# Patient Record
Sex: FEMALE | Race: AMERICAN INDIAN OR ALASKA NATIVE | NOT HISPANIC OR LATINO | Employment: UNEMPLOYED
[De-identification: names, ages, dates, MRNs, and addresses within clinical notes are randomized per-mention and may not be internally consistent; named-entity substitution may affect disease eponyms.]

---

## 2017-07-08 ENCOUNTER — HEALTH MAINTENANCE LETTER (OUTPATIENT)
Age: 22
End: 2017-07-08

## 2020-10-17 ENCOUNTER — APPOINTMENT (OUTPATIENT)
Dept: GENERAL RADIOLOGY | Facility: CLINIC | Age: 25
End: 2020-10-17
Attending: EMERGENCY MEDICINE

## 2020-10-17 ENCOUNTER — HOSPITAL ENCOUNTER (EMERGENCY)
Facility: CLINIC | Age: 25
Discharge: HOME | End: 2020-10-17
Attending: EMERGENCY MEDICINE | Admitting: EMERGENCY MEDICINE

## 2020-10-17 VITALS — TEMPERATURE: 96.3 F | OXYGEN SATURATION: 98 % | HEART RATE: 120 BPM

## 2020-10-17 DIAGNOSIS — L20.9 ATOPIC DERMATITIS, UNSPECIFIED TYPE: ICD-10-CM

## 2020-10-17 DIAGNOSIS — M25.511 RIGHT SHOULDER PAIN, UNSPECIFIED CHRONICITY: ICD-10-CM

## 2020-10-17 PROCEDURE — 73030 X-RAY EXAM OF SHOULDER: CPT | Mod: RT

## 2020-10-17 PROCEDURE — 99283 EMERGENCY DEPT VISIT LOW MDM: CPT | Performed by: EMERGENCY MEDICINE

## 2020-10-17 PROCEDURE — 99284 EMERGENCY DEPT VISIT MOD MDM: CPT | Performed by: EMERGENCY MEDICINE

## 2020-10-17 RX ORDER — PERMETHRIN 50 MG/G
CREAM TOPICAL
Qty: 60 G | Refills: 0 | Status: ON HOLD | OUTPATIENT
Start: 2020-10-17 | End: 2023-12-27

## 2020-10-17 RX ORDER — HYDROXYZINE HYDROCHLORIDE 25 MG/1
25-50 TABLET, FILM COATED ORAL 3 TIMES DAILY PRN
Qty: 30 TABLET | Refills: 0 | Status: SHIPPED | OUTPATIENT
Start: 2020-10-17 | End: 2024-04-18
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
## 2020-10-17 NOTE — ED AVS SNAPSHOT
[facility] Emergency Department  [address]  [place] [address]  Phone: [phone]  Fax: [phone]                                    Elizabeth Henson   MRN: [id number]    Department: [facility] Emergency Department   Date of Visit: 10/17/2020           After Visit Summary Signature Page    I have received my discharge instructions, and my questions have been answered. I have discussed any challenges I see with this plan with the nurse or doctor.    ..........................................................................................................................................  Patient/Patient Representative Signature      ..........................................................................................................................................  Patient Representative Print Name and Relationship to Patient    ..................................................               ................................................  Date                                   Time    ..........................................................................................................................................  Reviewed by Signature/Title    ...................................................              ..............................................  Date                                               Time          22EPIC Rev 08/18

## 2023-12-25 ENCOUNTER — ANESTHESIA (OUTPATIENT)
Dept: SURGERY | Facility: CLINIC | Age: 28
End: 2023-12-25
Payer: COMMERCIAL

## 2023-12-25 ENCOUNTER — HOSPITAL ENCOUNTER (INPATIENT)
Facility: CLINIC | Age: 28
LOS: 1 days | Discharge: LEFT BEFORE DISCHARGE | End: 2023-12-27
Attending: EMERGENCY MEDICINE | Admitting: OBSTETRICS & GYNECOLOGY
Payer: COMMERCIAL

## 2023-12-25 DIAGNOSIS — Z98.891 S/P EMERGENCY CESAREAN SECTION: Primary | ICD-10-CM

## 2023-12-25 DIAGNOSIS — O09.33 NO PRENATAL CARE IN CURRENT PREGNANCY IN THIRD TRIMESTER: ICD-10-CM

## 2023-12-25 LAB
ABO/RH(D): NORMAL
ALBUMIN SERPL BCG-MCNC: 3.5 G/DL (ref 3.5–5.2)
ALP SERPL-CCNC: 235 U/L (ref 40–150)
ALT SERPL W P-5'-P-CCNC: 14 U/L (ref 0–50)
ANION GAP SERPL CALCULATED.3IONS-SCNC: 12 MMOL/L (ref 7–15)
ANTIBODY SCREEN: NEGATIVE
AST SERPL W P-5'-P-CCNC: 29 U/L (ref 0–45)
BASOPHILS # BLD AUTO: 0 10E3/UL (ref 0–0.2)
BASOPHILS NFR BLD AUTO: 0 %
BILIRUB SERPL-MCNC: 0.4 MG/DL
BUN SERPL-MCNC: 5.7 MG/DL (ref 6–20)
CALCIUM SERPL-MCNC: 8.9 MG/DL (ref 8.6–10)
CHLORIDE SERPL-SCNC: 101 MMOL/L (ref 98–107)
CREAT SERPL-MCNC: 0.55 MG/DL (ref 0.51–0.95)
DEPRECATED HCO3 PLAS-SCNC: 23 MMOL/L (ref 22–29)
EGFRCR SERPLBLD CKD-EPI 2021: >90 ML/MIN/1.73M2
EOSINOPHIL # BLD AUTO: 0 10E3/UL (ref 0–0.7)
EOSINOPHIL NFR BLD AUTO: 0 %
ERYTHROCYTE [DISTWIDTH] IN BLOOD BY AUTOMATED COUNT: 12.6 % (ref 10–15)
GLUCOSE SERPL-MCNC: 99 MG/DL (ref 70–99)
HCT VFR BLD AUTO: 40.2 % (ref 35–47)
HGB BLD-MCNC: 13 G/DL (ref 11.7–15.7)
IMM GRANULOCYTES # BLD: 0.1 10E3/UL
IMM GRANULOCYTES NFR BLD: 0 %
LYMPHOCYTES # BLD AUTO: 0.8 10E3/UL (ref 0.8–5.3)
LYMPHOCYTES NFR BLD AUTO: 5 %
MCH RBC QN AUTO: 28 PG (ref 26.5–33)
MCHC RBC AUTO-ENTMCNC: 32.3 G/DL (ref 31.5–36.5)
MCV RBC AUTO: 87 FL (ref 78–100)
MONOCYTES # BLD AUTO: 0.7 10E3/UL (ref 0–1.3)
MONOCYTES NFR BLD AUTO: 4 %
NEUTROPHILS # BLD AUTO: 14.4 10E3/UL (ref 1.6–8.3)
NEUTROPHILS NFR BLD AUTO: 91 %
NRBC # BLD AUTO: 0 10E3/UL
NRBC BLD AUTO-RTO: 0 /100
PLATELET # BLD AUTO: 305 10E3/UL (ref 150–450)
POTASSIUM SERPL-SCNC: 4.2 MMOL/L (ref 3.4–5.3)
PROT SERPL-MCNC: 7.1 G/DL (ref 6.4–8.3)
RBC # BLD AUTO: 4.64 10E6/UL (ref 3.8–5.2)
SODIUM SERPL-SCNC: 136 MMOL/L (ref 135–145)
SPECIMEN EXPIRATION DATE: NORMAL
WBC # BLD AUTO: 16 10E3/UL (ref 4–11)

## 2023-12-25 PROCEDURE — 258N000003 HC RX IP 258 OP 636: Performed by: EMERGENCY MEDICINE

## 2023-12-25 PROCEDURE — 76815 OB US LIMITED FETUS(S): CPT | Mod: 26 | Performed by: EMERGENCY MEDICINE

## 2023-12-25 PROCEDURE — 99291 CRITICAL CARE FIRST HOUR: CPT | Performed by: EMERGENCY MEDICINE

## 2023-12-25 PROCEDURE — 250N000011 HC RX IP 250 OP 636: Performed by: EMERGENCY MEDICINE

## 2023-12-25 PROCEDURE — 258N000003 HC RX IP 258 OP 636: Performed by: NURSE ANESTHETIST, CERTIFIED REGISTERED

## 2023-12-25 PROCEDURE — 96374 THER/PROPH/DIAG INJ IV PUSH: CPT | Performed by: EMERGENCY MEDICINE

## 2023-12-25 PROCEDURE — 99291 CRITICAL CARE FIRST HOUR: CPT | Mod: 25 | Performed by: EMERGENCY MEDICINE

## 2023-12-25 PROCEDURE — 86900 BLOOD TYPING SEROLOGIC ABO: CPT | Performed by: EMERGENCY MEDICINE

## 2023-12-25 PROCEDURE — 80053 COMPREHEN METABOLIC PANEL: CPT | Performed by: EMERGENCY MEDICINE

## 2023-12-25 PROCEDURE — 250N000011 HC RX IP 250 OP 636: Performed by: NURSE ANESTHETIST, CERTIFIED REGISTERED

## 2023-12-25 PROCEDURE — 710N000010 HC RECOVERY PHASE 1, LEVEL 2, PER MIN: Performed by: OBSTETRICS & GYNECOLOGY

## 2023-12-25 PROCEDURE — 84702 CHORIONIC GONADOTROPIN TEST: CPT | Performed by: EMERGENCY MEDICINE

## 2023-12-25 PROCEDURE — 36415 COLL VENOUS BLD VENIPUNCTURE: CPT | Performed by: EMERGENCY MEDICINE

## 2023-12-25 PROCEDURE — 272N000001 HC OR GENERAL SUPPLY STERILE: Performed by: OBSTETRICS & GYNECOLOGY

## 2023-12-25 PROCEDURE — 360N000076 HC SURGERY LEVEL 3, PER MIN: Performed by: OBSTETRICS & GYNECOLOGY

## 2023-12-25 PROCEDURE — 250N000025 HC SEVOFLURANE, PER MIN: Performed by: OBSTETRICS & GYNECOLOGY

## 2023-12-25 PROCEDURE — 250N000009 HC RX 250: Performed by: NURSE ANESTHETIST, CERTIFIED REGISTERED

## 2023-12-25 PROCEDURE — 370N000017 HC ANESTHESIA TECHNICAL FEE, PER MIN: Performed by: OBSTETRICS & GYNECOLOGY

## 2023-12-25 PROCEDURE — 76815 OB US LIMITED FETUS(S): CPT | Performed by: EMERGENCY MEDICINE

## 2023-12-25 PROCEDURE — 96361 HYDRATE IV INFUSION ADD-ON: CPT | Performed by: EMERGENCY MEDICINE

## 2023-12-25 PROCEDURE — 85025 COMPLETE CBC W/AUTO DIFF WBC: CPT | Performed by: EMERGENCY MEDICINE

## 2023-12-25 RX ORDER — MORPHINE SULFATE 4 MG/ML
4 INJECTION, SOLUTION INTRAMUSCULAR; INTRAVENOUS ONCE
Status: COMPLETED | OUTPATIENT
Start: 2023-12-25 | End: 2023-12-25

## 2023-12-25 RX ADMIN — SODIUM CHLORIDE: 9 INJECTION, SOLUTION INTRAVENOUS at 23:34

## 2023-12-25 RX ADMIN — FENTANYL CITRATE 100 MCG: 50 INJECTION INTRAMUSCULAR; INTRAVENOUS at 23:55

## 2023-12-25 RX ADMIN — SUCCINYLCHOLINE CHLORIDE 140 MG: 20 INJECTION, SOLUTION INTRAMUSCULAR; INTRAVENOUS; PARENTERAL at 23:41

## 2023-12-25 RX ADMIN — PROPOFOL 200 MCG/KG/MIN: 10 INJECTION, EMULSION INTRAVENOUS at 23:42

## 2023-12-25 RX ADMIN — PROPOFOL 200 MG: 10 INJECTION, EMULSION INTRAVENOUS at 23:41

## 2023-12-25 RX ADMIN — SODIUM CHLORIDE, POTASSIUM CHLORIDE, SODIUM LACTATE AND CALCIUM CHLORIDE: 600; 310; 30; 20 INJECTION, SOLUTION INTRAVENOUS at 23:31

## 2023-12-25 RX ADMIN — SODIUM CHLORIDE 500 ML: 9 INJECTION, SOLUTION INTRAVENOUS at 23:15

## 2023-12-25 RX ADMIN — CEFAZOLIN 2 G: 1 INJECTION, POWDER, FOR SOLUTION INTRAMUSCULAR; INTRAVENOUS at 23:50

## 2023-12-25 RX ADMIN — Medication 300 ML/HR: at 23:47

## 2023-12-25 RX ADMIN — NOREPINEPHRINE BITARTRATE 6.4 MCG: 1 INJECTION, SOLUTION, CONCENTRATE INTRAVENOUS at 23:45

## 2023-12-25 RX ADMIN — MORPHINE SULFATE 4 MG: 4 INJECTION, SOLUTION INTRAMUSCULAR; INTRAVENOUS at 22:50

## 2023-12-25 ASSESSMENT — ACTIVITIES OF DAILY LIVING (ADL): ADLS_ACUITY_SCORE: 33

## 2023-12-26 ENCOUNTER — APPOINTMENT (OUTPATIENT)
Dept: GENERAL RADIOLOGY | Facility: CLINIC | Age: 28
End: 2023-12-26
Attending: OBSTETRICS & GYNECOLOGY
Payer: COMMERCIAL

## 2023-12-26 ENCOUNTER — ANESTHESIA EVENT (OUTPATIENT)
Dept: SURGERY | Facility: CLINIC | Age: 28
End: 2023-12-26
Payer: COMMERCIAL

## 2023-12-26 PROBLEM — O09.33 NO PRENATAL CARE IN CURRENT PREGNANCY IN THIRD TRIMESTER: Status: ACTIVE | Noted: 2023-12-26

## 2023-12-26 LAB
ALBUMIN UR-MCNC: NEGATIVE MG/DL
ANION GAP SERPL CALCULATED.3IONS-SCNC: 15 MMOL/L (ref 7–15)
APPEARANCE UR: CLEAR
ATRIAL RATE - MUSE: 81 BPM
BILIRUB UR QL STRIP: NEGATIVE
BUN SERPL-MCNC: 4.8 MG/DL (ref 6–20)
CALCIUM SERPL-MCNC: 8.2 MG/DL (ref 8.6–10)
CHLORIDE SERPL-SCNC: 105 MMOL/L (ref 98–107)
COLOR UR AUTO: ABNORMAL
CREAT SERPL-MCNC: 0.47 MG/DL (ref 0.51–0.95)
DEPRECATED HCO3 PLAS-SCNC: 17 MMOL/L (ref 22–29)
DIASTOLIC BLOOD PRESSURE - MUSE: NORMAL MMHG
EGFRCR SERPLBLD CKD-EPI 2021: >90 ML/MIN/1.73M2
GLUCOSE SERPL-MCNC: 116 MG/DL (ref 70–99)
GLUCOSE UR STRIP-MCNC: NEGATIVE MG/DL
HBV SURFACE AG SERPL QL IA: NONREACTIVE
HCG INTACT+B SERPL-ACNC: ABNORMAL MIU/ML
HGB UR QL STRIP: ABNORMAL
HIV 1+2 AB+HIV1 P24 AG SERPL QL IA: NONREACTIVE
INTERPRETATION ECG - MUSE: NORMAL
KETONES UR STRIP-MCNC: 20 MG/DL
LEUKOCYTE ESTERASE UR QL STRIP: ABNORMAL
NITRATE UR QL: NEGATIVE
P AXIS - MUSE: 59 DEGREES
PH UR STRIP: 7.5 [PH] (ref 5–7)
POTASSIUM SERPL-SCNC: 4 MMOL/L (ref 3.4–5.3)
PR INTERVAL - MUSE: 172 MS
QRS DURATION - MUSE: 82 MS
QT - MUSE: 412 MS
QTC - MUSE: 478 MS
R AXIS - MUSE: 83 DEGREES
RBC URINE: 92 /HPF
RUBV IGG SERPL QL IA: 1.36 INDEX
RUBV IGG SERPL QL IA: POSITIVE
SODIUM SERPL-SCNC: 137 MMOL/L (ref 135–145)
SP GR UR STRIP: 1.01 (ref 1–1.03)
SQUAMOUS EPITHELIAL: <1 /HPF
SYSTOLIC BLOOD PRESSURE - MUSE: NORMAL MMHG
T AXIS - MUSE: 58 DEGREES
T PALLIDUM AB SER QL: NONREACTIVE
UROBILINOGEN UR STRIP-MCNC: NORMAL MG/DL
VENTRICULAR RATE- MUSE: 81 BPM
WBC URINE: 23 /HPF

## 2023-12-26 PROCEDURE — 81001 URINALYSIS AUTO W/SCOPE: CPT | Performed by: EMERGENCY MEDICINE

## 2023-12-26 PROCEDURE — 87086 URINE CULTURE/COLONY COUNT: CPT | Performed by: EMERGENCY MEDICINE

## 2023-12-26 PROCEDURE — 250N000009 HC RX 250: Performed by: OBSTETRICS & GYNECOLOGY

## 2023-12-26 PROCEDURE — 36415 COLL VENOUS BLD VENIPUNCTURE: CPT

## 2023-12-26 PROCEDURE — 96361 HYDRATE IV INFUSION ADD-ON: CPT | Performed by: EMERGENCY MEDICINE

## 2023-12-26 PROCEDURE — 250N000013 HC RX MED GY IP 250 OP 250 PS 637

## 2023-12-26 PROCEDURE — 999N000063 XR ABDOMEN PORT 1 VIEW

## 2023-12-26 PROCEDURE — 250N000011 HC RX IP 250 OP 636

## 2023-12-26 PROCEDURE — 250N000011 HC RX IP 250 OP 636: Performed by: ANESTHESIOLOGY

## 2023-12-26 PROCEDURE — 250N000011 HC RX IP 250 OP 636: Mod: JZ | Performed by: NURSE ANESTHETIST, CERTIFIED REGISTERED

## 2023-12-26 PROCEDURE — 36415 COLL VENOUS BLD VENIPUNCTURE: CPT | Performed by: ANESTHESIOLOGY

## 2023-12-26 PROCEDURE — 120N000002 HC R&B MED SURG/OB UMMC

## 2023-12-26 PROCEDURE — 93010 ELECTROCARDIOGRAM REPORT: CPT | Performed by: INTERNAL MEDICINE

## 2023-12-26 PROCEDURE — 93005 ELECTROCARDIOGRAM TRACING: CPT

## 2023-12-26 PROCEDURE — 99221 1ST HOSP IP/OBS SF/LOW 40: CPT | Mod: AI | Performed by: ANESTHESIOLOGY

## 2023-12-26 PROCEDURE — 99238 HOSP IP/OBS DSCHRG MGMT 30/<: CPT | Mod: GC | Performed by: STUDENT IN AN ORGANIZED HEALTH CARE EDUCATION/TRAINING PROGRAM

## 2023-12-26 PROCEDURE — 87340 HEPATITIS B SURFACE AG IA: CPT

## 2023-12-26 PROCEDURE — 88307 TISSUE EXAM BY PATHOLOGIST: CPT | Mod: TC | Performed by: OBSTETRICS & GYNECOLOGY

## 2023-12-26 PROCEDURE — 258N000003 HC RX IP 258 OP 636: Performed by: EMERGENCY MEDICINE

## 2023-12-26 PROCEDURE — 88307 TISSUE EXAM BY PATHOLOGIST: CPT | Mod: 26 | Performed by: PATHOLOGY

## 2023-12-26 PROCEDURE — 87389 HIV-1 AG W/HIV-1&-2 AB AG IA: CPT

## 2023-12-26 PROCEDURE — 86762 RUBELLA ANTIBODY: CPT

## 2023-12-26 PROCEDURE — 86780 TREPONEMA PALLIDUM: CPT

## 2023-12-26 PROCEDURE — 80048 BASIC METABOLIC PNL TOTAL CA: CPT | Performed by: ANESTHESIOLOGY

## 2023-12-26 PROCEDURE — 74018 RADEX ABDOMEN 1 VIEW: CPT | Mod: 26 | Performed by: STUDENT IN AN ORGANIZED HEALTH CARE EDUCATION/TRAINING PROGRAM

## 2023-12-26 PROCEDURE — 250N000011 HC RX IP 250 OP 636: Performed by: EMERGENCY MEDICINE

## 2023-12-26 PROCEDURE — 87186 SC STD MICRODIL/AGAR DIL: CPT | Performed by: EMERGENCY MEDICINE

## 2023-12-26 PROCEDURE — 59514 CESAREAN DELIVERY ONLY: CPT | Mod: GC | Performed by: OBSTETRICS & GYNECOLOGY

## 2023-12-26 RX ORDER — ONDANSETRON 2 MG/ML
4 INJECTION INTRAMUSCULAR; INTRAVENOUS EVERY 30 MIN PRN
Status: DISCONTINUED | OUTPATIENT
Start: 2023-12-26 | End: 2023-12-26 | Stop reason: HOSPADM

## 2023-12-26 RX ORDER — AMOXICILLIN 250 MG
2 CAPSULE ORAL 2 TIMES DAILY
Status: DISCONTINUED | OUTPATIENT
Start: 2023-12-26 | End: 2023-12-27 | Stop reason: HOSPADM

## 2023-12-26 RX ORDER — BISACODYL 10 MG
10 SUPPOSITORY, RECTAL RECTAL DAILY PRN
Status: DISCONTINUED | OUTPATIENT
Start: 2023-12-28 | End: 2023-12-27 | Stop reason: HOSPADM

## 2023-12-26 RX ORDER — ONDANSETRON 4 MG/1
4 TABLET, ORALLY DISINTEGRATING ORAL EVERY 6 HOURS PRN
Status: DISCONTINUED | OUTPATIENT
Start: 2023-12-26 | End: 2023-12-27 | Stop reason: HOSPADM

## 2023-12-26 RX ORDER — CYCLOBENZAPRINE HCL 10 MG
10 TABLET ORAL 3 TIMES DAILY
Status: DISCONTINUED | OUTPATIENT
Start: 2023-12-26 | End: 2023-12-26

## 2023-12-26 RX ORDER — SODIUM CHLORIDE 9 MG/ML
INJECTION, SOLUTION INTRAVENOUS CONTINUOUS PRN
Status: DISCONTINUED | OUTPATIENT
Start: 2023-12-25 | End: 2023-12-26

## 2023-12-26 RX ORDER — HYDRALAZINE HYDROCHLORIDE 20 MG/ML
2.5-5 INJECTION INTRAMUSCULAR; INTRAVENOUS EVERY 10 MIN PRN
Status: DISCONTINUED | OUTPATIENT
Start: 2023-12-26 | End: 2023-12-26 | Stop reason: HOSPADM

## 2023-12-26 RX ORDER — KETOROLAC TROMETHAMINE 30 MG/ML
INJECTION, SOLUTION INTRAMUSCULAR; INTRAVENOUS PRN
Status: DISCONTINUED | OUTPATIENT
Start: 2023-12-26 | End: 2023-12-26

## 2023-12-26 RX ORDER — NALOXONE HYDROCHLORIDE 0.4 MG/ML
0.4 INJECTION, SOLUTION INTRAMUSCULAR; INTRAVENOUS; SUBCUTANEOUS
Status: DISCONTINUED | OUTPATIENT
Start: 2023-12-26 | End: 2023-12-27 | Stop reason: HOSPADM

## 2023-12-26 RX ORDER — LABETALOL HYDROCHLORIDE 5 MG/ML
10 INJECTION, SOLUTION INTRAVENOUS
Status: DISCONTINUED | OUTPATIENT
Start: 2023-12-26 | End: 2023-12-26 | Stop reason: HOSPADM

## 2023-12-26 RX ORDER — OXYCODONE HYDROCHLORIDE 5 MG/1
5 TABLET ORAL EVERY 4 HOURS PRN
Status: DISCONTINUED | OUTPATIENT
Start: 2023-12-26 | End: 2023-12-27

## 2023-12-26 RX ORDER — ONDANSETRON 2 MG/ML
4 INJECTION INTRAMUSCULAR; INTRAVENOUS EVERY 6 HOURS PRN
Status: DISCONTINUED | OUTPATIENT
Start: 2023-12-26 | End: 2023-12-27 | Stop reason: HOSPADM

## 2023-12-26 RX ORDER — DEXAMETHASONE SODIUM PHOSPHATE 4 MG/ML
INJECTION, SOLUTION INTRA-ARTICULAR; INTRALESIONAL; INTRAMUSCULAR; INTRAVENOUS; SOFT TISSUE PRN
Status: DISCONTINUED | OUTPATIENT
Start: 2023-12-26 | End: 2023-12-26

## 2023-12-26 RX ORDER — FENTANYL CITRATE 50 UG/ML
INJECTION, SOLUTION INTRAMUSCULAR; INTRAVENOUS PRN
Status: DISCONTINUED | OUTPATIENT
Start: 2023-12-25 | End: 2023-12-26

## 2023-12-26 RX ORDER — CYCLOBENZAPRINE HCL 10 MG
10 TABLET ORAL EVERY 8 HOURS PRN
Status: DISCONTINUED | OUTPATIENT
Start: 2023-12-26 | End: 2023-12-27 | Stop reason: HOSPADM

## 2023-12-26 RX ORDER — METOCLOPRAMIDE 10 MG/1
10 TABLET ORAL EVERY 6 HOURS PRN
Status: DISCONTINUED | OUTPATIENT
Start: 2023-12-26 | End: 2023-12-27 | Stop reason: HOSPADM

## 2023-12-26 RX ORDER — PROCHLORPERAZINE MALEATE 10 MG
10 TABLET ORAL EVERY 6 HOURS PRN
Status: DISCONTINUED | OUTPATIENT
Start: 2023-12-26 | End: 2023-12-27 | Stop reason: HOSPADM

## 2023-12-26 RX ORDER — HYDROMORPHONE HCL IN WATER/PF 6 MG/30 ML
0.2 PATIENT CONTROLLED ANALGESIA SYRINGE INTRAVENOUS EVERY 5 MIN PRN
Status: DISCONTINUED | OUTPATIENT
Start: 2023-12-26 | End: 2023-12-26 | Stop reason: HOSPADM

## 2023-12-26 RX ORDER — DEXAMETHASONE SODIUM PHOSPHATE 4 MG/ML
4 INJECTION, SOLUTION INTRA-ARTICULAR; INTRALESIONAL; INTRAMUSCULAR; INTRAVENOUS; SOFT TISSUE
Status: DISCONTINUED | OUTPATIENT
Start: 2023-12-26 | End: 2023-12-26 | Stop reason: HOSPADM

## 2023-12-26 RX ORDER — PROPOFOL 10 MG/ML
INJECTION, EMULSION INTRAVENOUS CONTINUOUS PRN
Status: DISCONTINUED | OUTPATIENT
Start: 2023-12-25 | End: 2023-12-26

## 2023-12-26 RX ORDER — OXYTOCIN/0.9 % SODIUM CHLORIDE 30/500 ML
340 PLASTIC BAG, INJECTION (ML) INTRAVENOUS CONTINUOUS PRN
Status: DISCONTINUED | OUTPATIENT
Start: 2023-12-26 | End: 2023-12-27 | Stop reason: HOSPADM

## 2023-12-26 RX ORDER — KETOROLAC TROMETHAMINE 30 MG/ML
30 INJECTION, SOLUTION INTRAMUSCULAR; INTRAVENOUS EVERY 6 HOURS
Status: COMPLETED | OUTPATIENT
Start: 2023-12-26 | End: 2023-12-26

## 2023-12-26 RX ORDER — TRANEXAMIC ACID 10 MG/ML
1 INJECTION, SOLUTION INTRAVENOUS EVERY 30 MIN PRN
Status: DISCONTINUED | OUTPATIENT
Start: 2023-12-26 | End: 2023-12-27 | Stop reason: HOSPADM

## 2023-12-26 RX ORDER — ACETAMINOPHEN 325 MG/1
975 TABLET ORAL ONCE
Status: DISCONTINUED | OUTPATIENT
Start: 2023-12-26 | End: 2023-12-26 | Stop reason: HOSPADM

## 2023-12-26 RX ORDER — HYDROMORPHONE HCL IN WATER/PF 6 MG/30 ML
0.4 PATIENT CONTROLLED ANALGESIA SYRINGE INTRAVENOUS EVERY 5 MIN PRN
Status: DISCONTINUED | OUTPATIENT
Start: 2023-12-26 | End: 2023-12-26 | Stop reason: HOSPADM

## 2023-12-26 RX ORDER — CARBOPROST TROMETHAMINE 250 UG/ML
250 INJECTION, SOLUTION INTRAMUSCULAR
Status: DISCONTINUED | OUTPATIENT
Start: 2023-12-26 | End: 2023-12-27 | Stop reason: HOSPADM

## 2023-12-26 RX ORDER — FENTANYL CITRATE 50 UG/ML
25 INJECTION, SOLUTION INTRAMUSCULAR; INTRAVENOUS EVERY 5 MIN PRN
Status: DISCONTINUED | OUTPATIENT
Start: 2023-12-26 | End: 2023-12-26 | Stop reason: HOSPADM

## 2023-12-26 RX ORDER — OXYTOCIN 10 [USP'U]/ML
10 INJECTION, SOLUTION INTRAMUSCULAR; INTRAVENOUS
Status: DISCONTINUED | OUTPATIENT
Start: 2023-12-26 | End: 2023-12-27 | Stop reason: HOSPADM

## 2023-12-26 RX ORDER — LIDOCAINE 40 MG/G
CREAM TOPICAL
Status: DISCONTINUED | OUTPATIENT
Start: 2023-12-26 | End: 2023-12-27 | Stop reason: HOSPADM

## 2023-12-26 RX ORDER — SODIUM CHLORIDE, SODIUM LACTATE, POTASSIUM CHLORIDE, CALCIUM CHLORIDE 600; 310; 30; 20 MG/100ML; MG/100ML; MG/100ML; MG/100ML
INJECTION, SOLUTION INTRAVENOUS
Status: DISPENSED
Start: 2023-12-26 | End: 2023-12-27

## 2023-12-26 RX ORDER — METHYLERGONOVINE MALEATE 0.2 MG/ML
200 INJECTION INTRAVENOUS
Status: DISCONTINUED | OUTPATIENT
Start: 2023-12-26 | End: 2023-12-27 | Stop reason: HOSPADM

## 2023-12-26 RX ORDER — MEPERIDINE HYDROCHLORIDE 25 MG/ML
12.5 INJECTION INTRAMUSCULAR; INTRAVENOUS; SUBCUTANEOUS EVERY 5 MIN PRN
Status: DISCONTINUED | OUTPATIENT
Start: 2023-12-26 | End: 2023-12-26 | Stop reason: HOSPADM

## 2023-12-26 RX ORDER — PROCHLORPERAZINE 25 MG
25 SUPPOSITORY, RECTAL RECTAL EVERY 12 HOURS PRN
Status: DISCONTINUED | OUTPATIENT
Start: 2023-12-26 | End: 2023-12-27 | Stop reason: HOSPADM

## 2023-12-26 RX ORDER — MISOPROSTOL 200 UG/1
400 TABLET ORAL
Status: DISCONTINUED | OUTPATIENT
Start: 2023-12-26 | End: 2023-12-27 | Stop reason: HOSPADM

## 2023-12-26 RX ORDER — LIDOCAINE 4 G/G
1 PATCH TOPICAL
Status: DISCONTINUED | OUTPATIENT
Start: 2023-12-26 | End: 2023-12-27 | Stop reason: HOSPADM

## 2023-12-26 RX ORDER — AMOXICILLIN 250 MG
1 CAPSULE ORAL 2 TIMES DAILY
Status: DISCONTINUED | OUTPATIENT
Start: 2023-12-26 | End: 2023-12-27 | Stop reason: HOSPADM

## 2023-12-26 RX ORDER — MISOPROSTOL 200 UG/1
800 TABLET ORAL
Status: DISCONTINUED | OUTPATIENT
Start: 2023-12-26 | End: 2023-12-27 | Stop reason: HOSPADM

## 2023-12-26 RX ORDER — PROPOFOL 10 MG/ML
INJECTION, EMULSION INTRAVENOUS PRN
Status: DISCONTINUED | OUTPATIENT
Start: 2023-12-25 | End: 2023-12-26

## 2023-12-26 RX ORDER — SIMETHICONE 80 MG
80 TABLET,CHEWABLE ORAL 4 TIMES DAILY PRN
Status: DISCONTINUED | OUTPATIENT
Start: 2023-12-26 | End: 2023-12-27 | Stop reason: HOSPADM

## 2023-12-26 RX ORDER — MODIFIED LANOLIN
OINTMENT (GRAM) TOPICAL
Status: DISCONTINUED | OUTPATIENT
Start: 2023-12-26 | End: 2023-12-27 | Stop reason: HOSPADM

## 2023-12-26 RX ORDER — OXYTOCIN/0.9 % SODIUM CHLORIDE 30/500 ML
PLASTIC BAG, INJECTION (ML) INTRAVENOUS CONTINUOUS PRN
Status: DISCONTINUED | OUTPATIENT
Start: 2023-12-25 | End: 2023-12-26

## 2023-12-26 RX ORDER — CEFAZOLIN SODIUM 1 G/3ML
INJECTION, POWDER, FOR SOLUTION INTRAMUSCULAR; INTRAVENOUS PRN
Status: DISCONTINUED | OUTPATIENT
Start: 2023-12-25 | End: 2023-12-26

## 2023-12-26 RX ORDER — SODIUM CHLORIDE, SODIUM LACTATE, POTASSIUM CHLORIDE, CALCIUM CHLORIDE 600; 310; 30; 20 MG/100ML; MG/100ML; MG/100ML; MG/100ML
INJECTION, SOLUTION INTRAVENOUS CONTINUOUS
Status: DISCONTINUED | OUTPATIENT
Start: 2023-12-26 | End: 2023-12-26 | Stop reason: HOSPADM

## 2023-12-26 RX ORDER — ONDANSETRON 4 MG/1
4 TABLET, ORALLY DISINTEGRATING ORAL EVERY 30 MIN PRN
Status: DISCONTINUED | OUTPATIENT
Start: 2023-12-26 | End: 2023-12-26 | Stop reason: HOSPADM

## 2023-12-26 RX ORDER — NALOXONE HYDROCHLORIDE 0.4 MG/ML
0.2 INJECTION, SOLUTION INTRAMUSCULAR; INTRAVENOUS; SUBCUTANEOUS
Status: DISCONTINUED | OUTPATIENT
Start: 2023-12-26 | End: 2023-12-27 | Stop reason: HOSPADM

## 2023-12-26 RX ORDER — HYDROCORTISONE 25 MG/G
CREAM TOPICAL 3 TIMES DAILY PRN
Status: DISCONTINUED | OUTPATIENT
Start: 2023-12-26 | End: 2023-12-27 | Stop reason: HOSPADM

## 2023-12-26 RX ORDER — OXYCODONE HYDROCHLORIDE 5 MG/1
5 TABLET ORAL ONCE
Status: COMPLETED | OUTPATIENT
Start: 2023-12-26 | End: 2023-12-26

## 2023-12-26 RX ORDER — ACETAMINOPHEN 325 MG/1
975 TABLET ORAL EVERY 6 HOURS
Status: DISCONTINUED | OUTPATIENT
Start: 2023-12-26 | End: 2023-12-27 | Stop reason: HOSPADM

## 2023-12-26 RX ORDER — DEXTROSE, SODIUM CHLORIDE, SODIUM LACTATE, POTASSIUM CHLORIDE, AND CALCIUM CHLORIDE 5; .6; .31; .03; .02 G/100ML; G/100ML; G/100ML; G/100ML; G/100ML
INJECTION, SOLUTION INTRAVENOUS CONTINUOUS
Status: DISCONTINUED | OUTPATIENT
Start: 2023-12-26 | End: 2023-12-27 | Stop reason: HOSPADM

## 2023-12-26 RX ORDER — FENTANYL CITRATE 50 UG/ML
50 INJECTION, SOLUTION INTRAMUSCULAR; INTRAVENOUS EVERY 5 MIN PRN
Status: DISCONTINUED | OUTPATIENT
Start: 2023-12-26 | End: 2023-12-26 | Stop reason: HOSPADM

## 2023-12-26 RX ORDER — METOCLOPRAMIDE HYDROCHLORIDE 5 MG/ML
10 INJECTION INTRAMUSCULAR; INTRAVENOUS EVERY 6 HOURS PRN
Status: DISCONTINUED | OUTPATIENT
Start: 2023-12-26 | End: 2023-12-27 | Stop reason: HOSPADM

## 2023-12-26 RX ORDER — ACETAMINOPHEN 10 MG/ML
1000 INJECTION, SOLUTION INTRAVENOUS ONCE
Status: COMPLETED | OUTPATIENT
Start: 2023-12-26 | End: 2023-12-26

## 2023-12-26 RX ORDER — IBUPROFEN 800 MG/1
800 TABLET, FILM COATED ORAL EVERY 6 HOURS
Status: DISCONTINUED | OUTPATIENT
Start: 2023-12-27 | End: 2023-12-27 | Stop reason: HOSPADM

## 2023-12-26 RX ORDER — SODIUM CHLORIDE, SODIUM LACTATE, POTASSIUM CHLORIDE, CALCIUM CHLORIDE 600; 310; 30; 20 MG/100ML; MG/100ML; MG/100ML; MG/100ML
INJECTION, SOLUTION INTRAVENOUS CONTINUOUS PRN
Status: DISCONTINUED | OUTPATIENT
Start: 2023-12-25 | End: 2023-12-26

## 2023-12-26 RX ADMIN — HYDROMORPHONE HYDROCHLORIDE 0.5 MG: 1 INJECTION, SOLUTION INTRAMUSCULAR; INTRAVENOUS; SUBCUTANEOUS at 00:27

## 2023-12-26 RX ADMIN — FENTANYL CITRATE 50 MCG: 50 INJECTION INTRAMUSCULAR; INTRAVENOUS at 01:32

## 2023-12-26 RX ADMIN — DEXAMETHASONE SODIUM PHOSPHATE 6 MG: 4 INJECTION, SOLUTION INTRA-ARTICULAR; INTRALESIONAL; INTRAMUSCULAR; INTRAVENOUS; SOFT TISSUE at 00:20

## 2023-12-26 RX ADMIN — HYDROMORPHONE HYDROCHLORIDE 0.2 MG: 0.2 INJECTION, SOLUTION INTRAMUSCULAR; INTRAVENOUS; SUBCUTANEOUS at 03:01

## 2023-12-26 RX ADMIN — HYDROMORPHONE HYDROCHLORIDE 0.4 MG: 0.2 INJECTION, SOLUTION INTRAMUSCULAR; INTRAVENOUS; SUBCUTANEOUS at 02:35

## 2023-12-26 RX ADMIN — ACETAMINOPHEN 1000 MG: 10 INJECTION, SOLUTION INTRAVENOUS at 02:05

## 2023-12-26 RX ADMIN — KETOROLAC TROMETHAMINE 30 MG: 30 INJECTION, SOLUTION INTRAMUSCULAR; INTRAVENOUS at 23:22

## 2023-12-26 RX ADMIN — PROPOFOL 40 MG: 10 INJECTION, EMULSION INTRAVENOUS at 00:53

## 2023-12-26 RX ADMIN — FENTANYL CITRATE 50 MCG: 50 INJECTION, SOLUTION INTRAMUSCULAR; INTRAVENOUS at 01:58

## 2023-12-26 RX ADMIN — HYDROMORPHONE HYDROCHLORIDE 0.2 MG: 0.2 INJECTION, SOLUTION INTRAMUSCULAR; INTRAVENOUS; SUBCUTANEOUS at 03:37

## 2023-12-26 RX ADMIN — SENNOSIDES AND DOCUSATE SODIUM 2 TABLET: 50; 8.6 TABLET ORAL at 20:26

## 2023-12-26 RX ADMIN — HYDROMORPHONE HYDROCHLORIDE 0.2 MG: 0.2 INJECTION, SOLUTION INTRAMUSCULAR; INTRAVENOUS; SUBCUTANEOUS at 04:34

## 2023-12-26 RX ADMIN — HYDROMORPHONE HYDROCHLORIDE 0.5 MG: 1 INJECTION, SOLUTION INTRAMUSCULAR; INTRAVENOUS; SUBCUTANEOUS at 00:47

## 2023-12-26 RX ADMIN — SENNOSIDES AND DOCUSATE SODIUM 2 TABLET: 50; 8.6 TABLET ORAL at 09:24

## 2023-12-26 RX ADMIN — CYCLOBENZAPRINE 10 MG: 10 TABLET, FILM COATED ORAL at 13:10

## 2023-12-26 RX ADMIN — KETOROLAC TROMETHAMINE 30 MG: 30 INJECTION, SOLUTION INTRAMUSCULAR; INTRAVENOUS at 17:01

## 2023-12-26 RX ADMIN — OXYCODONE HYDROCHLORIDE 5 MG: 5 TABLET ORAL at 14:42

## 2023-12-26 RX ADMIN — OXYCODONE HYDROCHLORIDE 5 MG: 5 TABLET ORAL at 10:42

## 2023-12-26 RX ADMIN — ACETAMINOPHEN 975 MG: 325 TABLET, FILM COATED ORAL at 23:21

## 2023-12-26 RX ADMIN — ACETAMINOPHEN 975 MG: 325 TABLET, FILM COATED ORAL at 09:24

## 2023-12-26 RX ADMIN — KETOROLAC TROMETHAMINE 30 MG: 30 INJECTION, SOLUTION INTRAMUSCULAR; INTRAVENOUS at 09:25

## 2023-12-26 RX ADMIN — PROPOFOL 200 MCG/KG/MIN: 10 INJECTION, EMULSION INTRAVENOUS at 00:48

## 2023-12-26 RX ADMIN — KETOROLAC TROMETHAMINE 30 MG: 30 INJECTION, SOLUTION INTRAMUSCULAR at 01:04

## 2023-12-26 RX ADMIN — FENTANYL CITRATE 25 MCG: 50 INJECTION, SOLUTION INTRAMUSCULAR; INTRAVENOUS at 02:07

## 2023-12-26 RX ADMIN — HYDROMORPHONE HYDROCHLORIDE 0.2 MG: 0.2 INJECTION, SOLUTION INTRAMUSCULAR; INTRAVENOUS; SUBCUTANEOUS at 05:08

## 2023-12-26 RX ADMIN — FENTANYL CITRATE 25 MCG: 50 INJECTION, SOLUTION INTRAMUSCULAR; INTRAVENOUS at 02:17

## 2023-12-26 RX ADMIN — ACETAMINOPHEN 975 MG: 325 TABLET, FILM COATED ORAL at 17:01

## 2023-12-26 RX ADMIN — OXYCODONE HYDROCHLORIDE 5 MG: 5 TABLET ORAL at 18:49

## 2023-12-26 RX ADMIN — HYDROMORPHONE HYDROCHLORIDE 0.5 MG: 1 INJECTION, SOLUTION INTRAMUSCULAR; INTRAVENOUS; SUBCUTANEOUS at 00:24

## 2023-12-26 RX ADMIN — HYDROMORPHONE HYDROCHLORIDE 0.4 MG: 0.2 INJECTION, SOLUTION INTRAMUSCULAR; INTRAVENOUS; SUBCUTANEOUS at 02:24

## 2023-12-26 RX ADMIN — AZITHROMYCIN MONOHYDRATE 500 MG: 500 INJECTION, POWDER, LYOPHILIZED, FOR SOLUTION INTRAVENOUS at 00:40

## 2023-12-26 RX ADMIN — OXYCODONE HYDROCHLORIDE 5 MG: 5 TABLET ORAL at 13:14

## 2023-12-26 ASSESSMENT — ACTIVITIES OF DAILY LIVING (ADL)
ADLS_ACUITY_SCORE: 39
ADLS_ACUITY_SCORE: 22
ADLS_ACUITY_SCORE: 18
ADLS_ACUITY_SCORE: 22
ADLS_ACUITY_SCORE: 18
ADLS_ACUITY_SCORE: 22
ADLS_ACUITY_SCORE: 35
ADLS_ACUITY_SCORE: 22
ADLS_ACUITY_SCORE: 35
ADLS_ACUITY_SCORE: 22

## 2023-12-26 NOTE — H&P
"EMERGENT H&P NOTE:    Note delayed and limited due to emergent clinical care    I was paged to the ED for \"imminent delivery\". Within 5 minutes of receiving the page I was present at the bedside. The patient appeared to be in active labor. I was signed out that Elizabeth Henson presented for  labor with no dating and no prenatal care. VSS. I quickly performed a SVE noting footling breech presentation with both fetal thigh and breech in the vagina, grossly ruptured prior to arrival.  per report.    The ED provider was performing a bedside US to confirm dates. The patient stated she was 12 weeks pregnant and only recently tested positive.  My suspicion based on exam was >30 weeks. The bedside US BPD was notable for 32wga estimate in breech presentation. Given the emergent nature of events, formal dating was not appropriate and LMP was unknown.    Within 5 minutes of arrival I discussed with the patient my recommendation for emergent  section for breech  labor, PPROM and with presenting parts in the vagina. Risks and benefits were discussed and the patient verbally agreed to proceed with emergent surgery    Within 10- minutes of arrival I called a STAT  section and assisted in rolling the patient to the OR. OB staff on the [facility] were emergently paged. NICU was called. General surgery was paged while in transit to the OR for assistance as no OB staff were in house and may not arrive within the time frame of a STAT  section.     STAT  called at 2330. General surgery arrived and declined to assist, stating not credentialed for ceserean section. OB staff, Dr. Mirna Johnston expeditiously left her in-house post on labor and delivery on the [facility] to come to the [facility].     After reviewing details with staff remotely, I proceeded with the procedure prior to staff being present for concerns for severe fetal morbidity and or mortality. The patient was " placed under general anesthesia at 2342 and incision was made. Ob/Gyn Staff was present in the room for the completion of delivery of the baby.    See operative note for full details.     Past Medical History:   Diagnosis Date    Uncomplicated asthma      No past surgical history on file.     No Known Allergies     Current Outpatient Medications   Medication Instructions    hydrOXYzine HCl (ATARAX) 25-50 mg, Oral, 3 TIMES DAILY PRN    medroxyPROGESTERone (DEPO-PROVERA) 150 mg, Intramuscular, EVERY 3 MONTHS    medroxyPROGESTERone (DEPO-PROVERA) 150 mg, Intramuscular, EVERY 3 MONTHS    medroxyPROGESTERone (DEPO-PROVERA) 150 mg, Intramuscular, EVERY 3 MONTHS    medroxyPROGESTERone (DEPO-PROVERA) 150 mg, Intramuscular, EVERY 3 MONTHS    medroxyPROGESTERone (DEPO-PROVERA) 150 mg, Intramuscular, EVERY 3 MONTHS    medroxyPROGESTERone (DEPO-PROVERA) 150 mg, Intramuscular, EVERY 3 MONTHS    medroxyPROGESTERone (DEPO-PROVERA) 150 mg, Intramuscular, EVERY 3 MONTHS    ofloxacin (OCUFLOX) 0.3 % ophthalmic solution 1 drop, Both Eyes, EVERY 4 HOURS    permethrin (ELIMITE) 5 % external cream Massage into skin from head to foot.  Leave on for 8-14hrs and then wash off.  May repeat in 2 wks if needed.    venlafaxine (EFFEXOR-XR) 37.5 MG 24 hr capsule Take 1 capsule daily for 14 days, then take 2 capsules daily.      EXAM: see HPI for exam    SVE: footling breech, grossly ruptured  GEN: in acute distress pushing    BP (!) 141/94   Pulse 74   Temp 98  F (36.7  C) (Oral)   Resp 17   SpO2 100%      A/P Elizabeth HIGHTOWER [place] is a  now POD#0 s/p STAT PCCS for breech in  labor.     #postoperative recovery  #postpartum  - Stable from postoperative state, baby is in NICU. Patient will transfer to [facility] on [facility] for recovery.  - needs all prenatal labs w/ HIV to be sent  - discuss with patient about urine toxicology screen 2/2 absent prenatal care  - social work consult    #REY/MDD  - PTA venlafaxine, unable to verify if  taking, consider restarting    See operative note for more details    Duarte Figueroa DO MA  [facility]- PGY2  4:47 AM December 26, 2023     I examined Elizabeth Henson on 12/25/23 with Dr. Figueroa and agree with the presentation, exam and plan of care documented in this note with edits by me.  I discussed the patient and plan with Dr. Figueroa in transit from the [facility] [place] [place]; I arrived in the OR on the [place] just prior to completion of delivery of the baby and finished the case with Dr. Figueroa.  Mirna Johnston MD MPH

## 2023-12-26 NOTE — ANESTHESIA PREPROCEDURE EVALUATION
"Anesthesia Pre-Procedure Evaluation    Patient: Elizabeth Henson   MRN: [id number] : 1995        Procedure : Procedure(s):   section          Past Medical History:   Diagnosis Date    Uncomplicated asthma       No past surgical history on file.   No Known Allergies   Social History     Tobacco Use    Smoking status: Passive Smoke Exposure - Never Smoker    Smokeless tobacco: Not on file   Substance Use Topics    Alcohol use: No      Wt Readings from Last 1 Encounters:   10/25/16 57.4 kg (126 lb 9.6 oz)        Anesthesia Evaluation            ROS/MED HX  ENT/Pulmonary:       Neurologic:       Cardiovascular:       METS/Exercise Tolerance:     Hematologic:       Musculoskeletal:       GI/Hepatic:       Renal/Genitourinary:       Endo:       Psychiatric/Substance Use:       Infectious Disease:       Malignancy:       Other:      (+) Possibly pregnant (suspected third trimester pregnancy, breech delivery, no known prenatal care), , ,         Physical Exam    Airway   unable to assess          Respiratory Devices and Support         Dental    unable to assess        Cardiovascular    unable to assess         Pulmonary    Unable to assess               OUTSIDE LABS:  CBC:   Lab Results   Component Value Date    WBC 16.0 (H) 2023    WBC 6.4 2014    HGB 13.0 2023    HGB 15.1 2014    HCT 40.2 2023    HCT 45.0 2014     2023     2014     BMP:   Lab Results   Component Value Date     2023    POTASSIUM 4.2 2023    CHLORIDE 101 2023    CO2 23 2023    BUN 5.7 (L) 2023    CR 0.55 2023    GLC 99 2023     COAGS: No results found for: \"PTT\", \"INR\", \"FIBR\"  POC:   Lab Results   Component Value Date    HCG NEGATIVE 2014     HEPATIC:   Lab Results   Component Value Date    ALBUMIN 3.5 2023    PROTTOTAL 7.1 2023    ALT 14 2023    AST 29 2023    ALKPHOS 235 (H) 2023    " BILITOTAL 0.4 12/25/2023     OTHER:   Lab Results   Component Value Date    TAYLOR 8.9 12/25/2023    TSH 2.17 11/13/2014       Anesthesia Plan    ASA Status:  3, emergent    NPO Status:  ELEVATED Aspiration Risk/Unknown    Anesthesia Type: General.     - Airway: ETT   Induction: RSI.      Techniques and Equipment:     - Airway: Video-Laryngoscope     - Lines/Monitors: 2nd IV     Consents            Postoperative Care            Comments:    Other Comments: Emergent case. Unable to look up patient history or discuss history with patient or do physical exam due to emergent nature of surgery           Ilya Lopez MD    I have reviewed the pertinent notes and labs in the chart from the past 30 days and (re)examined the patient.  Any updates or changes from those notes are reflected in this note.

## 2023-12-26 NOTE — PROVIDER NOTIFICATION
12/26/23 1305   Provider Notification   Provider Name/Title Dr. Cho   Method of Notification Electronic Page   Request Evaluate-Remote   Notification Reason Medication Request     GR, would it be appropriate for pt to get a TAPS block?   Page returned, orders placed in epic

## 2023-12-26 NOTE — ANESTHESIA PROCEDURE NOTES
Airway       Patient location during procedure: OR       Procedure Start/Stop Times: 12/25/2023 11:43 PM  Staff -        Anesthesiologist:  Ilya Lopez MD       CRNA: Radha Charles APRN CRNA       Performed By: CRNA  Consent for Airway        Urgency: emergent       Consent: The procedure was performed in an emergent situation.  Indications and Patient Condition       Indications for airway management: liu-procedural       Induction type:RSI       Mask difficulty assessment: 0 - not attempted    Final Airway Details       Final airway type: endotracheal airway       Successful airway: ETT - single and Oral  Endotracheal Airway Details        ETT size (mm): 6.5       Cuffed: yes       Successful intubation technique: video laryngoscopy       VL Blade Size: Glidescope 3       Grade View of Cords: 1       Adjucts: stylet       Position: Right       Measured from: gums/teeth       Secured at (cm): 21       Bite block used: None    Post intubation assessment        Placement verified by: capnometry, equal breath sounds and chest rise        Number of attempts at approach: 1       Number of other approaches attempted: 0       Secured with: tape       Ease of procedure: easy       Dentition: Unchanged    Medication(s) Administered   Medication Administration Time: 12/25/2023 11:43 PM

## 2023-12-26 NOTE — PROGRESS NOTES
Pt arrived back in PACU and began to wake up. Attempting to complete fundals from [id number]. Pt declining d/t increased pain. Education provided. More pain medications given per PACU RN. See MAR for details. Incision dressing clean, dry, and intact. Will continue to check fundus and bleeding as pt allows.

## 2023-12-26 NOTE — PLAN OF CARE
Data: Vital signs within normal limits with exception of intermittent mildly elevated BP. Postpartum checks within normal limits - see flow record. Patient eating and drinking normally. Patient reported 8/10 pain. Asked to get up to bathroom, upon ambulation to toilet and sitting on toilet 400 mLs urine drained into bacon catheter. Patient reported improvement in pain and stated that she would like the bacon removed. Pain reported 6/10. Frequent voiding encouraged. No apparent signs of infection.     Action: Patient medicated during the shift for pain and cramping. See MAR. Patient reassessed within 1 hour after each medication and pain was improved - patient stated she was comfortable. Patient education done about plan of care. See flow record.  Response: Positive attachment behaviors observed with infant. Support person present.

## 2023-12-26 NOTE — BRIEF OP NOTE
[facility]  Brief Operative Note    Surgery Date:  2023  Surgeon(s): Mirna Johnston MD  Assistants:  Duarte Figueroa DO, MA G2    Preoperative Diagnoses:  -  at approximately 32wga  - Footling breech in labor  - imminent delivery   - no prenatal care    Postoperative diagnoses:  -  at approximately 32wga, now delivered    Procedure performed:  STAT Primary T incision  section via pfannenstiel skin incision with 2 layer uterine closure    Anesthesia:  GETA  Est Blood Loss (mL): 1000 mL  Fluid replacement: 1000 mL crystalloid  Urine Output: 75 mL  Specimens: placenta  Additional intraoperative medications: ancef and azithromycin   Complications: None apparent    Operative findings:   - Single, liveborn  infant at 2347 hours on 2023. Apgars of 3 and 3 at one and five minutes.  Birth weight: 2200 g.  Fetal presentation: footling breech. Amniotic fluid: absent.    - Placenta intact with 3 vessel cord.     - Normal appearing uterus, fallopian tubes, ovaries.   -  No intraabdominal adhesions.  No abdominal wall adhesions    Component      Latest Ref Rng 2023  1:40 AM   Ph Capillary      7.35 - 7.45  7.34 (L)    PCO2 Capillary      26 - 40 mm Hg 46 (H)    PO2 Capillary      40 - 105 mm Hg 35 (L)    Bicarbonate Cap      16 - 24 mmol/L 25 (H)    Base Excess Cap      -9.0 - 1.8 mmol/L -1.6    FIO2 21       Legend:  (L) Low  (H) High    Disposition: Transferred in stable condition to the PACU    Duarte Figueroa DO, MA  [facility]- PGY2  3:07 AM 2023

## 2023-12-26 NOTE — OP NOTE
[facility]   OPERATIVE NOTE:  SECTION     Surgery Date: 2023  Surgeon(s): Mirna Johnston MD  Assistants:  Duarte Figueroa DO MA G2     Preoperative Diagnoses:  -  at estimated 32w by bedside BPD  -  labor  - PPROM  - Fetal malpresentation (footling breech)  - Imminent delivery   - No prenatal care     Postoperative diagnoses:  -  at 32 weeks, now delivered     Procedure performed:  STAT Primary low transverse converted to low vertical  section via pfannenstiel skin incision with 2 layer uterine closure     Anesthesia:  GETA  Est Blood Loss (mL): 1000 mL  Fluid replacement: 1000 mL crystalloid  Urine Output: 75 mL  Specimens: placenta  Additional intraoperative medications: Ancef and Azithromycin   Complications: STAT  section for footling breech presentation ruptured in  labor on [facility]. Procedure complicated by 8 minute extremely difficult extraction given breech presentation, back down necessitating vertical extension of the initial transverse hysterotomy (T'd uterine incision). No NICU present at time of delivery due to emergent nature of procedure at [place] with no in house NICU staff.    RECOMMEND 36-37 week repeat  section in the future due to vertical extension of the low transverse hysterotomy into the active segment of the uterine body     Operative findings:   - Single, liveborn  infant at 2347 hours on 2023. Apgars of 3 and 3 at one and five minutes.  Birth weight: 2200 g.  Fetal presentation: footling breech. Amniotic fluid: absent.    - Placenta intact with 3 vessel cord.     - Normal appearing uterus, fallopian tubes, ovaries.   -  No intraabdominal adhesions.  No abdominal wall adhesions     Component      Latest Ref Rng 2023  1:40 AM   Ph Capillary      7.35 - 7.45  7.34 (L)    PCO2 Capillary      26 - 40 mm Hg 46 (H)    PO2 Capillary      40 - 105 mm Hg 35 (L)    Bicarbonate Cap       16 - 24 mmol/L 25 (H)    Base Excess Cap      -9.0 - 1.8 mmol/L -1.6    FIO2 21      Indication: Elizabeth Henson is a 28 year old  who presented in  labor and after PPROM to the [place] emergency department and was quickly diagnosed with footling breech presentation. The patient did not have any prenatal care. Dating was gathered via bedside US and BPD measurements. The patient had presented in precipitous labor. At this time a STAT  section was called. The risks, benefits, and alternatives of  delivery were explained and the patient agreed to proceed. The patient was immediately transferred from the trauma bay to the OR for preparation for surgery. NICU and OB attendings were notified.    Procedure details:  After obtaining verbal informed consent, the patient was taken to the operating room. She was placed in the dorsal supine position with a leftward tilt and prepped and draped in the usual sterile fashion after splashing with betadine.     Following intubation, the abdomen was entered through a low transverse skin incision. The skin incision was made sharply and carried through the subcutaneous tissue to the fascia.  Fascia was incised in the midline and extended laterally with blunt retraction. The muscle was  in the midline.      The peritoneum was entered bluntly and the opening extended by digital dissection with care to avoid the bladder. A bladder blade was placed. The lower segment of the uterus was opened sharply in a transverse fashion and extended with digital pressure. The breech was unable to be elevated to the hysterotomy over multiple failed attempts . At this time the hysterotomy was extended in a cephalad direction with bandage scissors in a T formation to optimize the delivery. The footling breach was elevated from the cervix and delivered along with the contralateral lower extremity. Sacrum was posterior, and unable to rotate anterior. The upper  extremities were delivered and the body was flexed to deliver the head. Total time spent from hysterotomy to delivery 8 mins. The cord was doubly clamped immediately and cut and the infant was handed off to the waiting anesthesia staff. The patient remained stable from delivery and due to emergent nature of procedure, I was also assisting in resuscitation of the  for 2-3 minutes until stabilization occurred.     A segment of the cord was cut and set aside for cord gases if needed. The placenta was expressed from the uterus.  The uterus was exteriorized from the abdomen and cleared of all clots and debris.  The uterus was massaged and oxytocin was given through the running IV. An extension from the left angle of the transverse hysterotomy was noted and was repaired first with 0 vicryl. The vertical extension of the hysterotomy was then repaired (first layer) with 0 vicryl in running, locked fashion. The transverse portion of the hysterotomy was repaired with 0-vicryl suture in a running locked fashion until the inferior portion of the vertical incision. The right apex of the transverse portion of hysterotomy was identified and closed with running locked 0-Vicryl meeting in the midline to complete closure of the hysterotomy. A 2nd layer of 3-0 Vicryl was used to reapproximate the serosa of the vertical extension in a non-locked baseball stitch. Two 3-0 vicryl reinforcement sutures were placed for oozing near the left angle. The uterus appeared hemostatic. The posterior cul-de-sac was suctioned and the uterus was returned to the abdomen. At this time a large Christopher-O retractor was placed to assist with visualization.    The bilateral pericolic gutters were suctioned. The hysterotomy was again inspected and found to be hemostatic.  The abdominal wall was examined and also found to be hemostatic.  The fascia was closed with a two running sutures of 0-looped PDS starting at both angles and meeting in the midline.  Subcutaneous tissue was irrigated. Areas that were not hemostatic were controlled with cautery.  The skin was closed with 4-0 monocryl in a subcuticular fashion. The patient tolerated the procedure well and was taken to the recovery room in stable condition. All sponge, needle and instrument counts were correct x2.    I, Duarte Figueroa DO, MA PGY2, after discussing with OB/Gyn staff in transit from the [facility], emergently started the case prior to attending staff being present due to emergent nature of clinical scenario and potential fetal morbility and mortality. Dr. Johnston left her post immediately on the [facility] and expeditiously arrived in the operating room as the baby was delivered. She was present and assisted with completion of the rest of the case. General Surgery was requested to staff abdominal entry as no in-house surgical attending physician was present for emergent assistance, however, declined to assist.    Duarte Figueroa DO, MA  [facility]- PGY2  3:53 AM December 26, 2023     I participated in the aspects of Elizabeth Henson's case with Dr. Figueroa on 12/25/2023 and agree with the operative details in this note with edits by me.     Mirna Johnston MD MPH

## 2023-12-26 NOTE — PROGRESS NOTES
Pt and partner unknown on term of pregnancy. OB resident at bedside for evaluation. Approximately 32 weeks via ultrasound. Gen surgery paged. Stat .

## 2023-12-26 NOTE — PLAN OF CARE
VSS during recovery. See flowsheets for details. Pain medication given by Daly PACU RN. See MAR for details. Pain well controlled per pt. Fundus firm and midline with scant bleeding. Incision dressing clean, dry, and intact. Ice pack and belly band applied for comfort. Ivon cares provided. Gallego in place with adequate urine output. Pt encouraged to ask questions as needed about surgery and care. No questions or concerns at this time. Report given to HENRIQUE Ferro. Pt to be transferred to [facility] by ambulance.

## 2023-12-26 NOTE — CARE PLAN
Patient arrived from [place] to [facility] unit via  EMS  at 5:50am ,with belongings, accompanied by spouse/ significant other, with infant is in the [place]. Received report from  Daly and Cayla DUENAS  . Unit and room orientation completd. Call light given; no concerns present at this time. Continue with plan of care.

## 2023-12-26 NOTE — DISCHARGE SUMMARY
[facility]   Discharge Summary    Elizabeth Henson MRN# [id number]   Age: 28 year old YOB: 1995     Date of Admission:  2023  Date of Discharge::  23 (eloped)  Admitting Physician:  Mirna Johnston MD  Discharge Physician:  Nanda Goddard MD             Admission Diagnoses:   -  at approximately 32wga  - Footling breech in labor  - imminent delivery   - no prenatal care          Discharge Diagnosis:     Same, delivered   Opioid use disorder          Procedures:     Procedure(s): Primary T'd  incision  section with 2 layer closure via Pfannenstiel  skin incision  GETA  anesthesia       Recommend all future deliveries be done via  at 36-37 weeks           Medications Prior to Admission:     Medications Prior to Admission   Medication Sig Dispense Refill Last Dose    hydrOXYzine (ATARAX) 25 MG tablet Take 1-2 tablets (25-50 mg) by mouth 3 times daily as needed for itching 30 tablet 0     medroxyPROGESTERone (DEPO-PROVERA) 150 MG/ML injection Inject 1 mL (150 mg) into the muscle every 3 months 0.9 mL 0     medroxyPROGESTERone (DEPO-PROVERA) 150 MG/ML injection Inject 1 mL (150 mg) into the muscle every 3 months 0.9 mL 0     medroxyPROGESTERone (DEPO-PROVERA) 150 MG/ML injection Inject 1 mL (150 mg) into the muscle every 3 months 0.9 mL 0     medroxyPROGESTERone (DEPO-PROVERA) 150 MG/ML injection Inject 1 mL (150 mg) into the muscle every 3 months 0.9 mL 3     medroxyPROGESTERone (DEPO-PROVERA) 150 MG/ML injection Inject 1 mL (150 mg) into the muscle every 3 months 0.9 mL 0     medroxyPROGESTERone (DEPO-PROVERA) 150 MG/ML injection Inject 1 mL (150 mg) into the muscle every 3 months 0.9 mL 0     medroxyPROGESTERone (DEPO-PROVERA) 150 MG/ML injection Inject 1 mL into the muscle every 3 months. 0.9 mL 0     ofloxacin (OCUFLOX) 0.3 % ophthalmic solution Place 1 drop into both eyes every 4 hours. 1 Bottle 0     permethrin (ELIMITE) 5 %  external cream Massage into skin from head to foot.  Leave on for 8-14hrs and then wash off.  May repeat in 2 wks if needed. 60 g 0     venlafaxine (EFFEXOR-XR) 37.5 MG 24 hr capsule Take 1 capsule daily for 14 days, then take 2 capsules daily. 48 capsule 0              Discharge Medications:        Review of your medicines        START taking        Dose / Directions   acetaminophen 325 MG tablet  Commonly known as: TYLENOL  Used for: S/P emergency  section      Dose: 650 mg  Take 2 tablets (650 mg) by mouth every 6 hours as needed for mild pain Start after Delivery.  Quantity: 100 tablet  Refills: 0     ibuprofen 600 MG tablet  Commonly known as: ADVIL/MOTRIN  Used for: S/P emergency  section      Dose: 600 mg  Take 1 tablet (600 mg) by mouth every 6 hours as needed for moderate pain Start after delivery  Quantity: 60 tablet  Refills: 0     senna-docusate 8.6-50 MG tablet  Commonly known as: SENOKOT-S/PERICOLACE  Used for: S/P emergency  section      Dose: 1 tablet  Take 1 tablet by mouth daily Start after delivery.  Quantity: 100 tablet  Refills: 0            CONTINUE these medicines which have NOT CHANGED        Dose / Directions   hydrOXYzine HCl 25 MG tablet  Commonly known as: ATARAX      Dose: 25-50 mg  Take 1-2 tablets (25-50 mg) by mouth 3 times daily as needed for itching  Quantity: 30 tablet  Refills: 0     ofloxacin 0.3 % ophthalmic solution  Commonly known as: OCUFLOX  Used for: Styes      Dose: 1 drop  Place 1 drop into both eyes every 4 hours.  Quantity: 1 Bottle  Refills: 0     venlafaxine 37.5 MG 24 hr capsule  Commonly known as: EFFEXOR XR  Used for: Depression      Take 1 capsule daily for 14 days, then take 2 capsules daily.  Quantity: 48 capsule  Refills: 0            STOP taking      medroxyPROGESTERone 150 MG/ML IM injection  Commonly known as: DEPO-PROVERA        permethrin 5 % external cream  Commonly known as: Elimite                  Where to get your medicines         These medications were sent to [place], [place] - [address]  [address] [facility], [facility] [address]      Phone: [phone]   acetaminophen 325 MG tablet  ibuprofen 600 MG tablet  senna-docusate 8.6-50 MG tablet             Consultations:   SW   Psychiatry          Brief Admission History:   Elizabeth Henson is a 28 year old, , presented ruptured and in  labor with footling breech presentation. The patient did not have any prenatal care. Dating was gathered via bedside US and BPD measurements. The patient had presented in precipitous labor. At this time a STAT  section was called. The risks, benefits, and alternatives of  delivery were explained and the patient agreed to proceed. The patient was immediately transferred from the trauma bay to the OR for preparation for surgery. NICU and OB attendings were notified.        Intraoperative course     Anesthesia:  GETA  Est Blood Loss (mL): 1000 mL  Fluid replacement: 1000 mL crystalloid  Urine Output: 75 mL  Specimens: placenta  Additional intraoperative medications: ancef and azithromycin   Complications: STAT  section for footling breech presentation ruptured in  labor on [facility]. Procedure complicated by 8 minute extremely difficult extraction with T'd uterine incision. No NICU present at time of delivery due to emergent nature of procedure at [place] with no in house NICU staff.     RECOMMEND 36-37 week repeat  section in the future due to classical incision     Operative findings:   - Single, liveborn  infant at 2347 hours on 2023. Apgars of 3 and 3 at one and five minutes.  Birth weight: 2200 g.  Fetal presentation: footling breech. Amniotic fluid: absent.    - Placenta intact with 3 vessel cord.     - Normal appearing uterus, fallopian tubes, ovaries.   -  No intraabdominal adhesions.  No abdominal wall adhesions     Component      Latest  Ref Rng 12/26/2023  1:40 AM   Ph Capillary      7.35 - 7.45  7.34 (L)    PCO2 Capillary      26 - 40 mm Hg 46 (H)    PO2 Capillary      40 - 105 mm Hg 35 (L)    Bicarbonate Cap      16 - 24 mmol/L 25 (H)    Base Excess Cap      -9.0 - 1.8 mmol/L -1.6    FIO2 21       Legend:  (L) Low  (H) High       Postpartum Course   The patient's hospital course was notable for withdrawal symptoms from opioid use disorder. She has never been on MAT but was interested in starting. She was uncertain about her desire to parent. She eloped from the hospital on the AM of 12/27 without starting MAT and we were unable to reach her afterwards.     Post-partum hemoglobin:   Hemoglobin   Date Value Ref Range Status   12/27/2023 11.0 (L) 11.7 - 15.7 g/dL Final   11/13/2014 15.1 11.7 - 15.7 g/dL Final     Rh positive, no Rhogam indicated          Discharge Instructions and Follow-Up:     Discharge diet: Regular   Discharge activity: No lifting greater than 20 lbs, pushing, pulling, or other strenuous activity for 6 weeks. Pelvic rest for 6 weeks including no sexual intercourse, tampons, or douching.    Discharge follow-up: Follow up with OB/Gyn ASAP to review discharge instructions/postpartum care   Wound care: Keep incision clean and dry           Discharge Disposition:     Eloped from the hospital without receiving discharge instructions or medications     Nanda Goddard MD

## 2023-12-26 NOTE — PROGRESS NOTES
[facility]   Postpartum Note    Name:  Elizabeth Henson  MRN: [id number]    S: Patient doing well. Reports increasing abdominal pain but still able to sleep through it. Encouraged use of PRN oxy. Tolerating regular diet. Not yet ambulating. Gallego in place. Reports flatus.  Undecided on parenting, hopes to meet with SW. Undecided on feeding, did not wish to discuss options for preventing milk from coming in today. Plans depo for postpartum contraception.     O:   Patient Vitals for the past 24 hrs:   BP Temp Temp src Pulse Resp SpO2   12/26/23 0735 139/73 99.3  F (37.4  C) Oral 65 16 96 %   12/26/23 0630 128/73 98.1  F (36.7  C) Oral 70 17 99 %   12/26/23 0555 (!) 139/90 98.4  F (36.9  C) Oral 72 18 98 %   12/26/23 0508 -- -- -- -- -- 99 %   12/26/23 0500 121/81 -- -- 88 -- 99 %   12/26/23 0445 -- -- -- -- -- 99 %   12/26/23 0434 (!) 141/94 -- -- -- -- 100 %   12/26/23 0430 (!) 141/94 -- -- 74 17 99 %   12/26/23 0415 (!) 151/83 -- -- 68 16 99 %   12/26/23 0400 (!) 148/88 -- -- 68 16 100 %   12/26/23 0348 (!) 142/88 98  F (36.7  C) Oral 72 12 100 %   12/26/23 0345 (!) 142/88 -- -- 77 17 99 %   12/26/23 0337 -- -- -- 83 20 98 %   12/26/23 0330 129/82 -- -- 73 23 100 %   12/26/23 0315 (!) 141/81 -- -- 84 18 100 %   12/26/23 0310 (!) 142/83 -- -- 71 18 100 %   12/26/23 0301 -- -- -- 66 17 99 %   12/26/23 0300 -- -- -- 60 18 100 %   12/26/23 0245 -- 97.4  F (36.3  C) Oral 64 20 100 %   12/26/23 0235 (!) 133/92 (!) 95.9  F (35.5  C) Core 61 22 100 %   12/26/23 0230 (!) 133/92 (!) 95.8  F (35.4  C) Core 60 23 100 %   12/26/23 0224 -- -- -- -- -- 100 %   12/26/23 0217 -- -- -- -- -- 100 %   12/26/23 0215 (!) 124/91 (!) 95.8  F (35.4  C) Core 92 16 100 %   12/26/23 0207 -- (!) 95.8  F (35.4  C) Core 82 17 100 %   12/26/23 0205 -- -- -- -- -- 100 %   12/26/23 0200 126/82 (!) 95.7  F (35.4  C) Core 98 23 100 %   12/26/23 0158 -- -- -- -- -- 100 %   12/26/23 0145 111/77 (!) 95.6  F (35.3  C) Core  75 18 100 %   23 0138 134/76 (!) 95.6  F (35.3  C) Core 80 19 100 %   23 2218 (!) 140/80 97.5  F (36.4  C) Oral 89 16 99 %     Gen:  Resting comfortably, NAD  CV:  Regular rate, well perfused  Pulm:  Breathing room air comfortably  Abd:  Soft, appropriately ttp, non-distended. Fundus firm and below umbilicus, firm and non-tender.  Incision: C/d/i, no surrounding redness or erythema with dressing in place  Ext:  Non-tender, 1+ LE edema b/l    I/O last 3 completed shifts:  In: 250 [IV Piggyback:250]  Out: 1793 [Urine:625; Blood:1168]    Hgb:   Hemoglobin   Date Value Ref Range Status   2023 13.0 11.7 - 15.7 g/dL Final   2014 15.1 11.7 - 15.7 g/dL Final       Assessment/Plan:  28 year old  on POD#1 s/p STAT primary CS.    Routine postpartum care:  Pain: Using scheduled ibuprofen, tylenol. Encouraged use of PRN oxy to achieve better pain control  Hgb: 13> QBL 1000> AM hgb pending. Patient is asymptomatic from acute blood loss anemia and vitals are reassuring. Will discharge home with PO iron if Hgb <10  Rh: pos  Imm:  Rubella and Hep B pending  Feed: undecided  BC: Depot provera  PNC: No PNC prior to delivery, prenatal labs now ordered and pending      Dispo: Discharge on POD# 2-3pending post op goals.    Gil Cho MD  Obstetrics & Gynecology, PGY-1  2023 8:18 AM    I have seen and examined the patient without the resident. I have reviewed, edited, and agree with the note.     Destinee Rodriguez MD

## 2023-12-26 NOTE — ED PROVIDER NOTES
"ED Provider Note  [facility]      History     Chief Complaint   Patient presents with    Abdominal Pain     Reports of lower abdominal pain describe as cramping that started about 3 pm this afternoon , pt also report of vaginal bleed. Pt is presently pregnant and doesn't know how many weeks gestation she is in.      HPI  Elizabeth Henson is a 28 year old female with a history of pyelonephritis and asthma who presents to the ED for evaluation of abdominal cramping.  Patient reports that she started to develop lower abdominal cramping at about 3 PM this afternoon and this has been persistent, occurring every few minutes.  She started to note some vaginal spotting with light pink spotting and slight areas of brighter red blood when she puts a panty liner on.  She has not saturated any panty liners or pads, she has not passed any clots.  She did have a positive pregnancy test at home \"a few weeks ago \".  She believes her LMP was \"a few months ago\".  She has never been pregnant before.  She has had no prenatal care.    She denies fevers or chills, no nasal congestion or sore throat.  No cough, shortness of breath, or chest pain.  Denies current nausea or vomiting.    Last bowel movement was 2 days ago.  She denies any dysuria or hematuria.        Past Medical History  Past Medical History:   Diagnosis Date    Uncomplicated asthma      No past surgical history on file.  hydrOXYzine (ATARAX) 25 MG tablet  medroxyPROGESTERone (DEPO-PROVERA) 150 MG/ML injection  medroxyPROGESTERone (DEPO-PROVERA) 150 MG/ML injection  medroxyPROGESTERone (DEPO-PROVERA) 150 MG/ML injection  medroxyPROGESTERone (DEPO-PROVERA) 150 MG/ML injection  medroxyPROGESTERone (DEPO-PROVERA) 150 MG/ML injection  medroxyPROGESTERone (DEPO-PROVERA) 150 MG/ML injection  medroxyPROGESTERone (DEPO-PROVERA) 150 MG/ML injection  ofloxacin (OCUFLOX) 0.3 % ophthalmic solution  permethrin (ELIMITE) 5 % external cream  venlafaxine " (EFFEXOR-XR) 37.5 MG 24 hr capsule      No Known Allergies  Family History  No family history on file.  Social History   Social History     Tobacco Use    Smoking status: Passive Smoke Exposure - Never Smoker   Substance Use Topics    Alcohol use: No    Drug use: No      Past medical history, past surgical history, medications, allergies, family history, and social history were reviewed with the patient. No additional pertinent items.      A complete review of systems was performed with pertinent positives and negatives noted in the HPI, and all other systems negative.    Physical Exam   BP: (!) 140/80  Pulse: 89  Temp: 97.5  F (36.4  C)  Resp: 16  SpO2: 99 %  Physical Exam  Vitals and nursing note reviewed.   Constitutional:       General: She is in acute distress.      Appearance: She is not diaphoretic.      Comments: Adult female, distressed, anxious   HENT:      Head: Atraumatic.      Mouth/Throat:      Mouth: Mucous membranes are moist.      Pharynx: Oropharynx is clear. No oropharyngeal exudate.   Eyes:      General: No scleral icterus.     Pupils: Pupils are equal, round, and reactive to light.   Cardiovascular:      Rate and Rhythm: Normal rate.      Pulses: Normal pulses.      Heart sounds: Normal heart sounds. No murmur heard.  Pulmonary:      Effort: No respiratory distress.      Breath sounds: Normal breath sounds.   Abdominal:      Comments: Soft, gravid, TTP in lower abdomen, hypoactive bowel sounds    Genitourinary:     Comments: Digital vaginal exam reveals what appears to be protruding amniotic sac with fetal parts palpable. Some scant bloody discharge noted  Musculoskeletal:         General: No tenderness.   Skin:     General: Skin is warm.      Findings: No rash.   Neurological:      Mental Status: She is alert.         ED Course, Procedures, & Data      Procedures  Results for orders placed during the hospital encounter of 12/25/23    POC US OB TRANSABDOMINAL LIMITED    Impression  Limited  Bedside ED fetal/uterus Ultrasound:  PERFORMED BY: Dr. Mike Bauer MD  INDICATIONS:  vaginal bleeding, pregnant  PROBE: Low frequency convex probe  BODY LOCATION:  Abdomen (retroperitoneum and bladder)  FINDINGS:  The ultrasound was performed with longitudinal and transverse views of the uterus and fetus. BPD c/w 32w gestation. . Head is superior.  INTERPRETATION:  breech presentation, 32 w gestation, .  IMAGE DOCUMENTATION: Images were archived to PACs system.               Results for orders placed or performed during the hospital encounter of 12/25/23   POC US OB TRANSABDOMINAL LIMITED     Status: Abnormal    Impression    Limited Bedside ED fetal/uterus Ultrasound:  PERFORMED BY: Dr. Mike Bauer MD  INDICATIONS:  vaginal bleeding, pregnant  PROBE: Low frequency convex probe  BODY LOCATION:  Abdomen (retroperitoneum and bladder)  FINDINGS:  The ultrasound was performed with longitudinal and transverse views of the uterus and fetus. BPD c/w 32w gestation. . Head is superior.   INTERPRETATION:  breech presentation, 32 w gestation, .   IMAGE DOCUMENTATION: Images were archived to PACs system.     Comprehensive metabolic panel     Status: Abnormal   Result Value Ref Range    Sodium 136 135 - 145 mmol/L    Potassium 4.2 3.4 - 5.3 mmol/L    Carbon Dioxide (CO2) 23 22 - 29 mmol/L    Anion Gap 12 7 - 15 mmol/L    Urea Nitrogen 5.7 (L) 6.0 - 20.0 mg/dL    Creatinine 0.55 0.51 - 0.95 mg/dL    GFR Estimate >90 >60 mL/min/1.73m2    Calcium 8.9 8.6 - 10.0 mg/dL    Chloride 101 98 - 107 mmol/L    Glucose 99 70 - 99 mg/dL    Alkaline Phosphatase 235 (H) 40 - 150 U/L    AST 29 0 - 45 U/L    ALT 14 0 - 50 U/L    Protein Total 7.1 6.4 - 8.3 g/dL    Albumin 3.5 3.5 - 5.2 g/dL    Bilirubin Total 0.4 <=1.2 mg/dL   HCG quantitative pregnancy (blood)     Status: Abnormal   Result Value Ref Range    hCG Quantitative 41,296 (H) <5 mIU/mL   CBC with platelets and differential     Status: Abnormal    Result Value Ref Range    WBC Count 16.0 (H) 4.0 - 11.0 10e3/uL    RBC Count 4.64 3.80 - 5.20 10e6/uL    Hemoglobin 13.0 11.7 - 15.7 g/dL    Hematocrit 40.2 35.0 - 47.0 %    MCV 87 78 - 100 fL    MCH 28.0 26.5 - 33.0 pg    MCHC 32.3 31.5 - 36.5 g/dL    RDW 12.6 10.0 - 15.0 %    Platelet Count 305 150 - 450 10e3/uL    % Neutrophils 91 %    % Lymphocytes 5 %    % Monocytes 4 %    % Eosinophils 0 %    % Basophils 0 %    % Immature Granulocytes 0 %    NRBCs per 100 WBC 0 <1 /100    Absolute Neutrophils 14.4 (H) 1.6 - 8.3 10e3/uL    Absolute Lymphocytes 0.8 0.8 - 5.3 10e3/uL    Absolute Monocytes 0.7 0.0 - 1.3 10e3/uL    Absolute Eosinophils 0.0 0.0 - 0.7 10e3/uL    Absolute Basophils 0.0 0.0 - 0.2 10e3/uL    Absolute Immature Granulocytes 0.1 <=0.4 10e3/uL    Absolute NRBCs 0.0 10e3/uL   Adult Type and Screen     Status: None   Result Value Ref Range    ABO/RH(D) O POS     Antibody Screen Negative Negative    SPECIMEN EXPIRATION DATE [id number]    CBC with Platelets & Differential     Status: Abnormal    Narrative    The following orders were created for panel order CBC with Platelets & Differential.  Procedure                               Abnormality         Status                     ---------                               -----------         ------                     CBC with platelets and d...[[id number]]  Abnormal            Final result                 Please view results for these tests on the individual orders.   ABO/Rh type and screen     Status: None    Narrative    The following orders were created for panel order ABO/Rh type and screen.  Procedure                               Abnormality         Status                     ---------                               -----------         ------                     Adult Type and Screen[[id number]]                            Final result                 Please view results for these tests on the individual orders.     Medications   azithromycin (ZITHROMAX)  500 mg in sodium chloride 0.9 % 250 mL intermittent infusion (500 mg Intravenous $New Bag 12/26/23 0040)   sodium chloride 0.9% BOLUS 500 mL (500 mLs Intravenous $New Bag 12/25/23 2315)   morphine (PF) injection 4 mg (4 mg Intravenous $Given 12/25/23 7910)     Labs Ordered and Resulted from Time of ED Arrival to Time of ED Departure   COMPREHENSIVE METABOLIC PANEL - Abnormal       Result Value    Sodium 136      Potassium 4.2      Carbon Dioxide (CO2) 23      Anion Gap 12      Urea Nitrogen 5.7 (*)     Creatinine 0.55      GFR Estimate >90      Calcium 8.9      Chloride 101      Glucose 99      Alkaline Phosphatase 235 (*)     AST 29      ALT 14      Protein Total 7.1      Albumin 3.5      Bilirubin Total 0.4     HCG QUANTITATIVE PREGNANCY - Abnormal    hCG Quantitative 41,296 (*)    CBC WITH PLATELETS AND DIFFERENTIAL - Abnormal    WBC Count 16.0 (*)     RBC Count 4.64      Hemoglobin 13.0      Hematocrit 40.2      MCV 87      MCH 28.0      MCHC 32.3      RDW 12.6      Platelet Count 305      % Neutrophils 91      % Lymphocytes 5      % Monocytes 4      % Eosinophils 0      % Basophils 0      % Immature Granulocytes 0      NRBCs per 100 WBC 0      Absolute Neutrophils 14.4 (*)     Absolute Lymphocytes 0.8      Absolute Monocytes 0.7      Absolute Eosinophils 0.0      Absolute Basophils 0.0      Absolute Immature Granulocytes 0.1      Absolute NRBCs 0.0     ROUTINE UA WITH MICROSCOPIC REFLEX TO CULTURE   TYPE AND SCREEN, ADULT    ABO/RH(D) O POS      Antibody Screen Negative      SPECIMEN EXPIRATION DATE [id number]     ABO/RH TYPE AND SCREEN     POC US OB TRANSABDOMINAL LIMITED   Final Result   Abnormal   Limited Bedside ED fetal/uterus Ultrasound:   PERFORMED BY: Dr. Mike Bauer MD   INDICATIONS:  vaginal bleeding, pregnant   PROBE: Low frequency convex probe   BODY LOCATION:  Abdomen (retroperitoneum and bladder)   FINDINGS:  The ultrasound was performed with longitudinal and transverse views of the  "uterus and fetus. BPD c/w 32w gestation. . Head is superior.    INTERPRETATION:  breech presentation, 32 w gestation, .    IMAGE DOCUMENTATION: Images were archived to PACs system.              Critical care: Based upon patient's evaluation she is critically ill and does require critical care.  Approximately 30 minutes was spent in assessment, reassessment, record review, discussion with consultants, discussion with patient and family, and documentation      Assessment & Plan    Patient presents to the emergency department for the above complaints.  Patient reports that she had a positive pregnancy test a few weeks ago, suspect LMP was \"a few months ago\".  She appears to be an unreliable historian.  She has a gravid abdomen on exam.  Pelvic exam was done and seems to reveal protruding amniotic sac with palpable fetal parts.  Patient was immediately moved to room 1.  I did immediately page the gynecology/oncology resident for imminent delivery to the emergency department.  I subsequently also spoke to the labor and delivery charge nurse on the [facility] and notified them of imminent delivery.  Dr. Bauer did perform a bedside US which did seem to show that the head was up so there is strong concern for breech presentation.  The gynecology/oncology resident very expeditiously arrived in the emergency department, quickly examined the patient, and immediately instructed that the patient be rolled upstairs to the operating room.  We have notified the OR as well as labor and delivery charge nurse to activate the NICU team.  Patient will be further cared for in the operating room for stat  by the OB team.    I have reviewed the nursing notes. I have reviewed the findings, diagnosis, plan and need for follow up with the patient.    New Prescriptions    No medications on file       Final diagnoses:   Breech presentation, single or unspecified fetus -  labor with imminent delivery   No prenatal " care in current pregnancy in third trimester       Diane Lindo MD   [facility] EMERGENCY DEPARTMENT  12/25/2023     Diane Lindo MD  12/26/23 0046

## 2023-12-26 NOTE — OR NURSING
Patient had to undergo STAT emergency . Due to the urgency, no consent was done. All surgery and anesthesia team aware  
Pt transported to [facility] post-partum unit, gave report to [facility], VSS and pt stable upon leaving PACU.   
declines

## 2023-12-26 NOTE — ANESTHESIA POSTPROCEDURE EVALUATION
Patient: Elizabeth Henson    Procedure: Procedure(s):  Primary  section       Anesthesia Type:  General    Note:  Disposition: Admission   Postop Pain Control: Uneventful            Sign Out: Well controlled pain   PONV: No   Neuro/Psych: Uneventful            Sign Out: Acceptable/Baseline neuro status   Airway/Respiratory: Uneventful            Sign Out: Acceptable/Baseline resp. status   CV/Hemodynamics: Uneventful            Sign Out: Acceptable CV status; No obvious hypovolemia; No obvious fluid overload   Other NRE: NONE   DID A NON-ROUTINE EVENT OCCUR? No           Last vitals:  Vitals Value Taken Time   /82 23 0330   Temp 36.3  C (97.4  F) 23 0245   Pulse 78 23 0338   Resp 22 23 0338   SpO2 100 % 238   Vitals shown include unfiled device data.    Electronically Signed By: Ilya Lopez MD  2023  3:39 AM

## 2023-12-26 NOTE — PROVIDER NOTIFICATION
G1 is in OR. Wants to bolus patient in 4533. I need specific order please and also more pain medication. Thank you!

## 2023-12-26 NOTE — PROVIDER NOTIFICATION
Ok to leave bacon in? Oral hydration encouraged; Patient sleepy. Dark urine output: 90 for 2.5 hours. Bolus?

## 2023-12-26 NOTE — CONSULTS
Inpatient Pain Management Service: Consultation      DATE OF CONSULT: 2023      REASON FOR PAIN CONSULTATION:  Elizabeth Henson is a 28 year old female I am seeing in consultation evaluation and recommendations for her pain condition.        CHIEF PAIN COMPLAINT: Acute postoperative pain (status post )      ASSESSMENT:     Elizabeth Henson is a 28 year old, , presented ruptured and in  labor with footling breech presentation. The patient did not have any prenatal care. Dating was gathered via bedside US and BPD measurements. The patient had presented in precipitous labor. At this time a STAT  was performed.  She is now complaining of acute incisional pain.  reviewed.  Patient does not have previous history of opioid use.    TREATMENT RECOMMENDATIONS/PLAN:     Oxycodone 5 -10 mg every 4 hours as needed  Lidocaine cream 4% 4 times daily as needed  Acetaminophen 975 mg every 6 hours scheduled  Toradol 15 mg 4 times daily as needed for 3 days      Thank you for consulting the Inpatient Pain Management Service.   The above recommendations are to be acted upon at the primary team s discretion.    To reach us:  Mon - Friday 8 AM - 3 PM: Pager [phone]   After hours, weekends and holidays: Primary service should call [phone] for the on-call pain specialist      REVIEW OF 10 BODY SYSTEMS: 10 point ROS of systems including Constitutional, Eyes, Respiratory, Cardiovascular, Gastroenterology, Genitourinary, Integumentary, Musculoskeletal, Psychiatric were all negative except for pertinent positives noted in my HPI.     Medications related to Pain Management:   Medications related to Pain Management (From now, onward)      Start     Dose/Rate Route Frequency Ordered Stop    23 0000  bisacodyl (DULCOLAX) suppository 10 mg         10 mg Rectal DAILY PRN 23 0721      23 0000  sodium phosphate (FLEET ENEMA) 1 enema         1 enema Rectal DAILY PRN  12/26/23 0721      12/27/23 0300  ibuprofen (ADVIL/MOTRIN) tablet 800 mg         800 mg Oral EVERY 6 HOURS 12/26/23 0721      12/26/23 2000  Lidocaine (LIDOCARE) 4 % Patch 1 patch         1 patch  over 12 Hours Transdermal EVERY 24 HOURS 2000 12/26/23 1253 12/26/23 1253  cyclobenzaprine (FLEXERIL) tablet 10 mg         10 mg Oral EVERY 8 HOURS PRN 12/26/23 1253      12/26/23 0900  ketorolac (TORADOL) injection 30 mg         30 mg Intravenous EVERY 6 HOURS 12/26/23 0721 12/27/23 0329    12/26/23 0800  acetaminophen (TYLENOL) tablet 975 mg         975 mg Oral EVERY 6 HOURS 12/26/23 0721 12/26/23 0800  senna-docusate (SENOKOT-S/PERICOLACE) 8.6-50 MG per tablet 1 tablet        See Hyperspace for full Linked Orders Report.    1 tablet Oral 2 TIMES DAILY 12/26/23 0721      12/26/23 0800  senna-docusate (SENOKOT-S/PERICOLACE) 8.6-50 MG per tablet 2 tablet        See Hyperspace for full Linked Orders Report.    2 tablet Oral 2 TIMES DAILY 12/26/23 0721      12/26/23 0721  lidocaine 1 % 0.1-1 mL         0.1-1 mL Other EVERY 1 HOUR PRN 12/26/23 0721      12/26/23 0721  lidocaine (LMX4) cream          Topical EVERY 1 HOUR PRN 12/26/23 0721      12/26/23 0721  oxyCODONE (ROXICODONE) tablet 5 mg         5 mg Oral EVERY 4 HOURS PRN 12/26/23 0721      12/26/23 0721  simethicone (MYLICON) chewable tablet 80 mg         80 mg Oral 4 TIMES DAILY PRN 12/26/23 0721                  OUTPATIENT OPIOIDS PRESCRIBED BY:      PRIMARY CARE PROVIDER: No Ref-Primary, Physician    [facility] database reviewed:       HOME/PREVIOUS MEDICATIONS:   Prior to Admission medications    Medication Sig Start Date End Date Taking? Authorizing Provider   hydrOXYzine (ATARAX) 25 MG tablet Take 1-2 tablets (25-50 mg) by mouth 3 times daily as needed for itching 10/17/20   Chidi Wei MD   medroxyPROGESTERone (DEPO-PROVERA) 150 MG/ML injection Inject 1 mL (150 mg) into the muscle every 3 months 2/7/15   Gordy Woody MD    medroxyPROGESTERone (DEPO-PROVERA) 150 MG/ML injection Inject 1 mL (150 mg) into the muscle every 3 months 14   John Ryan MD   medroxyPROGESTERone (DEPO-PROVERA) 150 MG/ML injection Inject 1 mL (150 mg) into the muscle every 3 months 14   John Ryan MD   medroxyPROGESTERone (DEPO-PROVERA) 150 MG/ML injection Inject 1 mL (150 mg) into the muscle every 3 months 14   Betty Dee MD   medroxyPROGESTERone (DEPO-PROVERA) 150 MG/ML injection Inject 1 mL (150 mg) into the muscle every 3 months 14   Leonardo Sood MD   medroxyPROGESTERone (DEPO-PROVERA) 150 MG/ML injection Inject 1 mL (150 mg) into the muscle every 3 months 8/15/13   Meli Paz MD   medroxyPROGESTERone (DEPO-PROVERA) 150 MG/ML injection Inject 1 mL into the muscle every 3 months. 13   Destinee Kwong MD   ofloxacin (OCUFLOX) 0.3 % ophthalmic solution Place 1 drop into both eyes every 4 hours. 3/6/13   Elizabeth Reich CNP   permethrin (ELIMITE) 5 % external cream Massage into skin from head to foot.  Leave on for 8-14hrs and then wash off.  May repeat in 2 wks if needed. 10/17/20   Chidi Wei MD   venlafaxine (EFFEXOR-XR) 37.5 MG 24 hr capsule Take 1 capsule daily for 14 days, then take 2 capsules daily. 14   John Ryan MD         ALLERGIES:  No Known Allergies         PAST MEDICAL AND PSYCHIATRIC HISTORY:    Past Medical History:   Diagnosis Date    Uncomplicated asthma            PAST SURGICAL HISTORY:   Past Surgical History:   Procedure Laterality Date     SECTION N/A 2023    Procedure: Primary  section;  Surgeon: Mirna Johnston MD;  Location:  OR           FAMILY HISTORY: family history is not on file.      HEALTH & LIFESTYLE PRACTICES:   Tobacco:  reports that she has never smoked. She has been exposed to tobacco smoke. She does not have any smokeless tobacco history on file.  Alcohol:  reports no history of alcohol use.  Illicit  drugs:  reports no history of drug use.      SOCIAL HISTORY:       LABORATORY VALUES:   Last Basic Metabolic Panel:  Lab Results   Component Value Date     12/26/2023      Lab Results   Component Value Date    POTASSIUM 4.0 12/26/2023     Lab Results   Component Value Date    CHLORIDE 105 12/26/2023     Lab Results   Component Value Date    TAYLOR 8.2 12/26/2023     Lab Results   Component Value Date    CO2 17 12/26/2023     Lab Results   Component Value Date    BUN 4.8 12/26/2023     Lab Results   Component Value Date    CR 0.47 12/26/2023     Lab Results   Component Value Date     12/26/2023       CBC results:  Lab Results   Component Value Date    WBC 16.0 12/25/2023    WBC 6.4 11/13/2014     Lab Results   Component Value Date    HGB 13.0 12/25/2023    HGB 15.1 11/13/2014     Lab Results   Component Value Date    HCT 40.2 12/25/2023    HCT 45.0 11/13/2014     Lab Results   Component Value Date     12/25/2023     11/13/2014       DIAGNOSTIC TESTS:       Labs above reviewed as well as additional relevant diagnostic studies from the EPIC record.       PHYSICAL EXAMINATION:  VITAL SIGNS:  B/P: 117/71, T: 98.6, P: 76, R: 16  Physical examination was not performed since it is a telephone visit        Nuria Reddy MD, PhD    [facility]  [address] [place] [id number] [phone]  Dept: [phone]

## 2023-12-26 NOTE — ANESTHESIA CARE TRANSFER NOTE
Patient: Elizabeth Henson    Procedure: Procedure(s):  Primary  section       Diagnosis: Breech birth [O32.1XX0]  Diagnosis Additional Information: No value filed.    Anesthesia Type:   No value filed.     Note:    Oropharynx: oropharynx clear of all foreign objects and spontaneously breathing  Level of Consciousness: awake  Oxygen Supplementation: face mask    Independent Airway: airway patency satisfactory and stable  Dentition: dentition unchanged  Vital Signs Stable: post-procedure vital signs reviewed and stable  Report to RN Given: handoff report given  Patient transferred to: PACU    Handoff Report: Identifed the Patient, Identified the Reponsible Provider, Reviewed the pertinent medical history, Discussed the surgical course, Reviewed Intra-OP anesthesia mangement and issues during anesthesia, Set expectations for post-procedure period and Allowed opportunity for questions and acknowledgement of understanding      Vitals:  Vitals Value Taken Time   /77 23 0145   Temp 35.5    Pulse 98 23 0150   Resp 21 23 0150   SpO2 100 % 23 0150   Vitals shown include unfiled device data.    Electronically Signed By: GABRIELE Ferguson [facility]  2023  1:51 AM

## 2023-12-26 NOTE — PROVIDER NOTIFICATION
12/26/23 1242   Provider Notification   Provider Name/Title Dr. Cho   Method of Notification Electronic Page   Request Evaluate-Remote   Notification Reason Status Update;Medication Request     GR, pt calling out in tears with pain, has received everything that is ordered. please call RN with plan

## 2023-12-26 NOTE — ED TRIAGE NOTES
Triage Assessment (Adult)       Row Name 12/25/23 8310          Triage Assessment    Airway WDL WDL        Respiratory WDL    Respiratory WDL WDL        Skin Circulation/Temperature WDL    Skin Circulation/Temperature WDL WDL        Cardiac WDL    Cardiac WDL WDL        Peripheral/Neurovascular WDL    Peripheral Neurovascular WDL WDL        Cognitive/Neuro/Behavioral WDL    Cognitive/Neuro/Behavioral WDL WDL

## 2023-12-26 NOTE — PROGRESS NOTES
"Copied from babys chart     SW acknowledges consult. DHARA met with Elizabeth in [facility] room. DHARA attempted initial assessment with Elizabeth. Elizabeth informed DHARA that this was an unexpected and scary experience for her. Elizabeth expressed to SW that she \"might not want to keep her baby\". Elizabeth reports not wanting to bring her baby into this environment. Elizabeth expressed being homeless currently and not having anywhere to go and currently being unemployed. Elizabeth stated her and her boyfriend tiffany were renting a room from somebody who was a \"slumlord\". Elizabeth reports that she cannot go back to that situation and is having trouble getting her stuff out of that house. Elizabeth also expressed her cats are still there.     Elizabeth reports that she does not have any additional support people in her life to care for her baby and stated her her mother is not an option and has her own things going on. Elizabeth reports she did not want to see baby due to not wanting to form attachment, but has seen him.    DHARA validated Elizabeth's emotions and informed Elizabeth that this was a big decision and she does not have to decide right away. DHARA informed Elizabeth that DHARA will continue to follow for support and informed   Elizabeth that SW will be out Wednesday but will be back Thursday to follow up. DHARA informed Elizabeth that there is additional SW's here to offer support tomorrow if needed.     *During visit the father of the baby Tiffany was present but did not speak. Elizabeth's friend Ernesto walked into the room during visit with DHARA and began to talk for Elizabeth and explain her situation. Due to this big decision that needs to be made by Elizabeth Samayoa did inquire if the friend Ernesto could leave the room to speak with Elizabeth alone. Elizabeth's friend said, \"I have things to do so I will be gone\". Elizabeth began to cry and said, \"no please dont leave\". Dhara informed Elizabeth that Dhara can come back but just wanted to " give her space to speak for herself and think through this choice.     DHARA will inquire with Elizabeth if she would like somebody from an adoption agency to come here and speak with her. DHARA will complete full assessment on Thursday upon returning.

## 2023-12-26 NOTE — PROVIDER NOTIFICATION
INFORMED OF PACU BP AND LAST BP AND EKG RESULT.   TO ASSESS PT AND CHART SHORTLY.  NO NEW ORDERS RECEIVED.

## 2023-12-27 VITALS
OXYGEN SATURATION: 98 % | SYSTOLIC BLOOD PRESSURE: 118 MMHG | TEMPERATURE: 99.4 F | HEART RATE: 74 BPM | DIASTOLIC BLOOD PRESSURE: 57 MMHG | RESPIRATION RATE: 18 BRPM

## 2023-12-27 PROBLEM — O13.9 GESTATIONAL HYPERTENSION: Status: ACTIVE | Noted: 2023-12-27

## 2023-12-27 LAB
ALBUMIN SERPL BCG-MCNC: 2.7 G/DL (ref 3.5–5.2)
ALP SERPL-CCNC: 162 U/L (ref 40–150)
ALT SERPL W P-5'-P-CCNC: 15 U/L (ref 0–50)
AMPHETAMINES UR QL SCN: ABNORMAL
ANION GAP SERPL CALCULATED.3IONS-SCNC: 10 MMOL/L (ref 7–15)
AST SERPL W P-5'-P-CCNC: 34 U/L (ref 0–45)
BACTERIA UR CULT: ABNORMAL
BARBITURATES UR QL SCN: ABNORMAL
BENZODIAZ UR QL SCN: ABNORMAL
BILIRUB SERPL-MCNC: 0.2 MG/DL
BUN SERPL-MCNC: 7.2 MG/DL (ref 6–20)
BZE UR QL SCN: ABNORMAL
C TRACH DNA SPEC QL PROBE+SIG AMP: NEGATIVE
CALCIUM SERPL-MCNC: 8 MG/DL (ref 8.6–10)
CANNABINOIDS UR QL SCN: ABNORMAL
CHLORIDE SERPL-SCNC: 103 MMOL/L (ref 98–107)
CREAT SERPL-MCNC: 0.62 MG/DL (ref 0.51–0.95)
CREAT UR-MCNC: 93 MG/DL
DEPRECATED HCO3 PLAS-SCNC: 25 MMOL/L (ref 22–29)
EGFRCR SERPLBLD CKD-EPI 2021: >90 ML/MIN/1.73M2
ERYTHROCYTE [DISTWIDTH] IN BLOOD BY AUTOMATED COUNT: 12.7 % (ref 10–15)
FENTANYL UR QL: ABNORMAL
GLUCOSE SERPL-MCNC: 96 MG/DL (ref 70–99)
HCT VFR BLD AUTO: 35 % (ref 35–47)
HGB BLD-MCNC: 11 G/DL (ref 11.7–15.7)
MCH RBC QN AUTO: 28.1 PG (ref 26.5–33)
MCHC RBC AUTO-ENTMCNC: 31.4 G/DL (ref 31.5–36.5)
MCV RBC AUTO: 89 FL (ref 78–100)
N GONORRHOEA DNA SPEC QL NAA+PROBE: NEGATIVE
OPIATES UR QL SCN: ABNORMAL
PCP QUAL URINE (ROCHE): ABNORMAL
PLATELET # BLD AUTO: 257 10E3/UL (ref 150–450)
POTASSIUM SERPL-SCNC: 3.9 MMOL/L (ref 3.4–5.3)
PROT SERPL-MCNC: 5.8 G/DL (ref 6.4–8.3)
RBC # BLD AUTO: 3.92 10E6/UL (ref 3.8–5.2)
SODIUM SERPL-SCNC: 138 MMOL/L (ref 135–145)
WBC # BLD AUTO: 15.4 10E3/UL (ref 4–11)

## 2023-12-27 PROCEDURE — 99231 SBSQ HOSP IP/OBS SF/LOW 25: CPT | Performed by: ANESTHESIOLOGY

## 2023-12-27 PROCEDURE — 250N000013 HC RX MED GY IP 250 OP 250 PS 637

## 2023-12-27 PROCEDURE — 80367 DRUG SCREENING PROPOXYPHENE: CPT

## 2023-12-27 PROCEDURE — 87491 CHLMYD TRACH DNA AMP PROBE: CPT

## 2023-12-27 PROCEDURE — 36415 COLL VENOUS BLD VENIPUNCTURE: CPT

## 2023-12-27 PROCEDURE — 99207 PR NO BILLABLE SERVICE THIS VISIT: CPT | Performed by: STUDENT IN AN ORGANIZED HEALTH CARE EDUCATION/TRAINING PROGRAM

## 2023-12-27 PROCEDURE — 85027 COMPLETE CBC AUTOMATED: CPT

## 2023-12-27 PROCEDURE — 80353 DRUG SCREENING COCAINE: CPT

## 2023-12-27 PROCEDURE — 80307 DRUG TEST PRSMV CHEM ANLYZR: CPT

## 2023-12-27 PROCEDURE — 82040 ASSAY OF SERUM ALBUMIN: CPT

## 2023-12-27 PROCEDURE — 250N000013 HC RX MED GY IP 250 OP 250 PS 637: Performed by: STUDENT IN AN ORGANIZED HEALTH CARE EDUCATION/TRAINING PROGRAM

## 2023-12-27 RX ORDER — ONDANSETRON 4 MG/1
4 TABLET, ORALLY DISINTEGRATING ORAL EVERY 6 HOURS PRN
Status: DISCONTINUED | OUTPATIENT
Start: 2023-12-27 | End: 2023-12-27 | Stop reason: HOSPADM

## 2023-12-27 RX ORDER — CLONIDINE HYDROCHLORIDE 0.1 MG/1
0.1 TABLET ORAL EVERY 6 HOURS PRN
Status: DISCONTINUED | OUTPATIENT
Start: 2023-12-27 | End: 2023-12-27 | Stop reason: HOSPADM

## 2023-12-27 RX ORDER — OXYCODONE HYDROCHLORIDE 5 MG/1
5 TABLET ORAL EVERY 6 HOURS PRN
Qty: 12 TABLET | Refills: 0 | Status: CANCELLED | OUTPATIENT
Start: 2023-12-27 | End: 2023-12-30

## 2023-12-27 RX ORDER — IBUPROFEN 600 MG/1
600 TABLET, FILM COATED ORAL EVERY 6 HOURS PRN
Qty: 60 TABLET | Refills: 0 | Status: SHIPPED | OUTPATIENT
Start: 2023-12-27

## 2023-12-27 RX ORDER — LIDOCAINE 40 MG/G
CREAM TOPICAL 4 TIMES DAILY PRN
Status: DISCONTINUED | OUTPATIENT
Start: 2023-12-27 | End: 2023-12-27 | Stop reason: HOSPADM

## 2023-12-27 RX ORDER — LOPERAMIDE HCL 2 MG
2 CAPSULE ORAL EVERY 4 HOURS PRN
Status: DISCONTINUED | OUTPATIENT
Start: 2023-12-27 | End: 2023-12-27 | Stop reason: HOSPADM

## 2023-12-27 RX ORDER — ACETAMINOPHEN 325 MG/1
650 TABLET ORAL EVERY 6 HOURS PRN
Qty: 100 TABLET | Refills: 0 | Status: SHIPPED | OUTPATIENT
Start: 2023-12-27

## 2023-12-27 RX ORDER — LIDOCAINE 4 G/G
1 PATCH TOPICAL
Status: DISCONTINUED | OUTPATIENT
Start: 2023-12-27 | End: 2023-12-27 | Stop reason: HOSPADM

## 2023-12-27 RX ORDER — MEDROXYPROGESTERONE ACETATE 150 MG/ML
150 INJECTION, SUSPENSION INTRAMUSCULAR ONCE
Status: COMPLETED | OUTPATIENT
Start: 2023-12-27 | End: 2023-12-27

## 2023-12-27 RX ORDER — AMOXICILLIN 250 MG
1 CAPSULE ORAL DAILY
Qty: 100 TABLET | Refills: 0 | Status: SHIPPED | OUTPATIENT
Start: 2023-12-27

## 2023-12-27 RX ORDER — OXYCODONE HYDROCHLORIDE 5 MG/1
5-10 TABLET ORAL
Status: DISCONTINUED | OUTPATIENT
Start: 2023-12-27 | End: 2023-12-27 | Stop reason: HOSPADM

## 2023-12-27 RX ORDER — HYDROXYZINE HYDROCHLORIDE 25 MG/1
25 TABLET, FILM COATED ORAL 4 TIMES DAILY PRN
Status: DISCONTINUED | OUTPATIENT
Start: 2023-12-27 | End: 2023-12-27 | Stop reason: HOSPADM

## 2023-12-27 RX ADMIN — CYCLOBENZAPRINE 10 MG: 10 TABLET, FILM COATED ORAL at 01:42

## 2023-12-27 RX ADMIN — CYCLOBENZAPRINE 10 MG: 10 TABLET, FILM COATED ORAL at 08:37

## 2023-12-27 RX ADMIN — OXYCODONE HYDROCHLORIDE 5 MG: 5 TABLET ORAL at 07:04

## 2023-12-27 RX ADMIN — SIMETHICONE 80 MG: 80 TABLET, CHEWABLE ORAL at 08:00

## 2023-12-27 RX ADMIN — OXYCODONE HYDROCHLORIDE 5 MG: 5 TABLET ORAL at 09:10

## 2023-12-27 RX ADMIN — IBUPROFEN 800 MG: 800 TABLET, FILM COATED ORAL at 05:44

## 2023-12-27 RX ADMIN — SENNOSIDES AND DOCUSATE SODIUM 2 TABLET: 50; 8.6 TABLET ORAL at 08:00

## 2023-12-27 RX ADMIN — LIDOCAINE 1 PATCH: 4 PATCH TOPICAL at 08:01

## 2023-12-27 RX ADMIN — HYDROXYZINE HYDROCHLORIDE 25 MG: 25 TABLET, FILM COATED ORAL at 09:11

## 2023-12-27 RX ADMIN — ACETAMINOPHEN 975 MG: 325 TABLET, FILM COATED ORAL at 05:43

## 2023-12-27 ASSESSMENT — ACTIVITIES OF DAILY LIVING (ADL)
ADLS_ACUITY_SCORE: 18

## 2023-12-27 NOTE — PROGRESS NOTES
Pain Service Progress Note  [facility]  Date: 2023       Patient Name: Elizabeth Henson  MRN: [id number]  Age: 28 year old  Sex: female      Assessment:  Elizabeth Henson is a 28 year old, , presented ruptured and in  labor with footling breech presentation. The patient did not have any prenatal care. Dating was gathered via bedside US and BPD measurements. The patient had presented in precipitous labor. At this time a STAT  was performed.  She is now complaining of acute incisional pain.  reviewed.  Patient does not have previous history of opioid use.     Plan/Recommendations:  Oxycodone 5 -10 mg every 4 hours as needed  Lidocaine cream 4% 4 times daily as needed  Acetaminophen 975 mg every 6 hours scheduled  Continue ibuprofen 800mg q6hr    Discussed with attending anesthesiologist    Riley Zimmer MD  2023     Overnight Events: Pain managed with current regimen of oxycodone, tylenol, and ibuprofen.    Subjective:  I am doing much better this morning.  Patient felt her pain was well managed with the oxycodone.   Nausea: No  Vomiting: No  Pruritus: No    Pain Location:  Incisional from C/S    Diet: Room Service  Advance Diet as Tolerated: Regular Diet Adult    Relevant Labs:  Recent Labs   Lab Test 23  0643      BUN 7.2       Physical Exam:  Vitals: /57   Pulse 74   Temp 37.4  C (99.4  F) (Oral)   Resp 18   SpO2 98%   Breastfeeding Unknown     Physical Exam:   Orientation:  Alert, oriented, and in no acute distress: Yes    Motor Examination:  Moving all extremities appropriately. Up walking around in the room.    Sensory Level:   Decrease in sensation: No    Relevant Medications:  Current Pain Medications:  Medications related to Pain Management (From now, onward)      Start     Dose/Rate Route Frequency Ordered Stop    23 0000  bisacodyl (DULCOLAX) suppository 10 mg         10 mg Rectal DAILY PRN 23  "0721      12/28/23 0000  sodium phosphate (FLEET ENEMA) 1 enema         1 enema Rectal DAILY PRN 12/26/23 0721      12/27/23 0830  oxyCODONE (ROXICODONE) tablet 5-10 mg         5-10 mg Oral EVERY 3 HOURS PRN 12/27/23 0828      12/27/23 0828  hydrOXYzine HCl (ATARAX) tablet 25 mg         25 mg Oral 4 TIMES DAILY PRN 12/27/23 0828      12/27/23 0800  Lidocaine (LIDOCARE) 4 % Patch 1 patch         1 patch  over 12 Hours Transdermal EVERY 24 HOURS 0800 12/27/23 0743      12/27/23 0300  ibuprofen (ADVIL/MOTRIN) tablet 800 mg         800 mg Oral EVERY 6 HOURS 12/26/23 0721      12/26/23 2000  Lidocaine (LIDOCARE) 4 % Patch 1 patch         1 patch  over 12 Hours Transdermal EVERY 24 HOURS 2000 12/26/23 1253      12/26/23 1253  cyclobenzaprine (FLEXERIL) tablet 10 mg         10 mg Oral EVERY 8 HOURS PRN 12/26/23 1253      12/26/23 0800  acetaminophen (TYLENOL) tablet 975 mg         975 mg Oral EVERY 6 HOURS 12/26/23 0721      12/26/23 0800  senna-docusate (SENOKOT-S/PERICOLACE) 8.6-50 MG per tablet 1 tablet        See Hyperspace for full Linked Orders Report.    1 tablet Oral 2 TIMES DAILY 12/26/23 0721      12/26/23 0800  senna-docusate (SENOKOT-S/PERICOLACE) 8.6-50 MG per tablet 2 tablet        See Hyperspace for full Linked Orders Report.    2 tablet Oral 2 TIMES DAILY 12/26/23 0721      12/26/23 0721  lidocaine 1 % 0.1-1 mL         0.1-1 mL Other EVERY 1 HOUR PRN 12/26/23 0721      12/26/23 0721  lidocaine (LMX4) cream          Topical EVERY 1 HOUR PRN 12/26/23 0721      12/26/23 0721  simethicone (MYLICON) chewable tablet 80 mg         80 mg Oral 4 TIMES DAILY PRN 12/26/23 0721              Primary Service Contacted with Recommendations? Yes      Please see A&P for additional details of medical decision making.      Acute Inpatient Pain Service [facility]  Hours of pain coverage 24/7   Page via Am[employer]- Please Page the Pain Team [facility]: \"PAIN MANAGEMENT ACUTE INPATIENT/ [facility]\"             "

## 2023-12-27 NOTE — PLAN OF CARE
Goal Outcome Evaluation:                  I was called to the room by the pts bedside nurse Karen DUBOSE. Upon arrival the bedside RN told me the pt had tin foil in her bed with her. This is the second time this shift the pt has been found to have tin foil with her. I explained to the pt that our main goal is to keep her safe while she is here and asked her if she had anything with her that she shouldn't have. Pt stated no.I asked pt about the tin foil and she said it was from her food. She did pull out a small piece of foil from her beside her. I asked her if I could take the foil and she gave it to me voluntarily. I called and spoke with the G2 Resident ELODIA Figueroa and updated him on this finding. He put in an order for a therapeutic room search. Two security staff and I went to her room and explained the reason for the room/belongings search. Pt agreed to have her things searched. The search was done by security, I was present in the room during the search. Security confiscated a roll of foil and a couple small tube looking devices. I also gave security the foil that was confiscated by me. Security took the items to dispose of them properly. The Tin foil was placed in a pt belongings bag with the pts label on it. Pt will get it back at the time of discharge. I updated yenni VARGHESE on the findings of the search. There was a male in the room laying on the couch sleep during the time of the search. The pt was cooperative throughout this process. I reiterated again that her safety is our priority and thanked her for being cooperative. I updated bedside RN Karen that the search had been completed.

## 2023-12-27 NOTE — CONSULTS
Psychiatry consulted to help with medication for opioid use disorder. Unfortunately before we could see patient it appears she left the unit and was gone long enough that risk management recommended she be discharged due to elopement.     If patient is readmitted and expresses interest in treatment for a substance use problem please reconsult us. Otherwise, can give her information for [facility].    [facility]  [facility] [address], [address]  [place], [place], [address]    Phone: [phone]  Mon-Fri: 9AM to 4PM  Walk in Hours: 9AM-11:30 AM, 12:30PM- 3PM      Savage Lopez MD    Department of Psychiatry  [phone]

## 2023-12-27 NOTE — PROVIDER NOTIFICATION
12/27/23 1433   Provider Notification   Provider Name/Title Dr Goddard   Method of Notification Electronic Page   Request Evaluate-Remote   Notification Reason Status Update     per conversation w/risk management, they stated patient needs to have a discharge order now since she has not returned to the unit in 4.5 hours and is not in the NICU. thx

## 2023-12-27 NOTE — DISCHARGE INSTRUCTIONS
If you are interested in medication assisted treatment for opioid use disorder the [facility] has daily walk-in appointments. They are located at ***

## 2023-12-27 NOTE — PROGRESS NOTES
2020 Pt had paraphernalia, charge notified. Charge took foil from pt and pt denies that she had any paraphernalia. Pt was asleep but easily rousable, Pt A and O.x4. Significant other asleep on the couch. Pt unable to provide urine sample for drug screen because she voided 30 min prior    Pt refused to provide urine sample for drug screen and keeps saying she will do so later.    0635 Urine for drug screen collected.

## 2023-12-27 NOTE — PLAN OF CARE
Data: Vital signs within normal limits. Postpartum checks within normal limits - see flow record. Patient eating and drinking normally. Patient able to empty bladder independently and is up ambulating. No apparent signs of infection. Incision healing well. Patient performing self cares and is able to care for infant. Writer walked in to the room at 1842 to assess patient. Pt was sleeping in wheelchair with back to the door, writer spoke her name, she did not wake. Pt's visitor spoke her name, she woke up. Found her to be sitting in the chair with a piece of tin foil in her lap, and she quickly crumpled the tin foil and placed in her bag. Pt was appropriately responding to my requests, and writer notified the Providers of the situation.  Action: Patient medicated during the shift for pain and cramping. See MAR. Patient reassessed within 1 hour after each medication and pain was improved - patient stated she was comfortable. Patient education done about pain control. See flow record.  Response: Positive attachment behaviors observed with infant. Support persons are present.   Plan: Anticipate discharge on POD 2-3.Goal Outcome Evaluation:      Plan of Care Reviewed With: patient    Overall Patient Progress: improvingOverall Patient Progress: improving

## 2023-12-27 NOTE — PLAN OF CARE
VSS and postpartum assessment WDL. She is up ad lenny, voiding, pain managed with tylenol, ibuprofen and oxycodone. Incision  dressing CDI. Uterus firm and midline. Scant lochia rubra. No breast or nipple pain; Pt not pumping. BM and passing flatus. Support person  significant other present at bedside; patient goes to visit baby often in NICU. Education provided see record. Continue with plan of care.    Pt refused to provide a urine specimen for drug screen. Encouraged pt to void in hat, States will do it later.    0635 Urine collected for drug screen.

## 2023-12-27 NOTE — PLAN OF CARE
Patient eloped from the unit around 10:00 and hasn't returned by 15:30. Provider placed discharge orders as the patient was considered AMA .  Discharge goals were not able to be assess and completed

## 2023-12-27 NOTE — PROGRESS NOTES
[facility]   Postpartum Note    Name:  Elizabeth Henson  MRN: [id number]    S: Patient doing well. Reports increasing abdominal pain.Tolerating regular diet. ambulating. Voiding . Reports flatus.  Plans depo for postpartum contraception.     O:   Patient Vitals for the past 24 hrs:   BP Temp Temp src Pulse Resp SpO2   23 0432 119/66 99.5  F (37.5  C) Oral 60 16 98 %   23 0132 134/71 98.5  F (36.9  C) Oral 82 16 99 %   23 0121 134/71 98.9  F (37.2  C) Oral 82 16 99 %   23 1849 124/62 98.2  F (36.8  C) Oral -- 16 --   23 1445 139/75 98.7  F (37.1  C) Oral 72 16 99 %   23 1033 117/71 98.6  F (37  C) Oral 76 16 97 %   23 0921 (!) 140/76 98  F (36.7  C) Oral 79 20 95 %   23 0836 (!) 143/80 99  F (37.2  C) Oral 85 20 98 %     Gen:  Resting comfortably, NAD  CV:  Regular rate, well perfused  Pulm:  Breathing room air comfortably  Abd:  Soft, appropriately ttp, non-distended. Fundus firm and below umbilicus, firm and non-tender.  Incision: C/d/i, no surrounding redness or erythema with dressing in place  Ext:  Non-tender, 1+ LE edema b/l        Hgb:   Hemoglobin   Date Value Ref Range Status   2023 11.0 (L) 11.7 - 15.7 g/dL Final   2014 15.1 11.7 - 15.7 g/dL Final       Assessment/Plan:  28 year old  on POD#2 s/p STAT primary CS for breech fetal presentation in labor.    GHTN  -HELLP labs WNL    OUD  - see below    Routine postpartum care:  Pain: Using scheduled ibuprofen, tylenol, oxy PRN  Hgb: 13> QBL 1000> 11.0  Patient is asymptomatic from acute blood loss anemia and vitals are reassuring.   Rh: pos  Imm:  Rubella immune and Hep B negative  Feed: formula  BC: Depo-provera prior to discharge if she desires    Duarte Figueroa DO, MA  [facility]- PGY2  8:01 AM 2023     I personally examined and evaluated Elizabeth Henson on 2023.  I discussed the patient with Dr. Figueroa. Elizabeth states she last used  fentanyl at about 10pm on 12/25. She normally uses every 1.5 days to avoid withdrawal symptoms. She has never used MAT before but is interested in starting. She is tearful about opioid use in pregnancy and is upset as she did not know how far along she was and regrets not stopping opioid use prior to delivery. Uncertain if she wants to parent the baby, but interested. She is worried that she won't have the resources/capability to parent. Currently houseless. Experiencing symptoms of restless legs and abdominal cramping from withdrawal. Comfort meds for withdrawal ordered and psychiatry consult placed for MAT.   Nanda Goddard MD

## 2023-12-27 NOTE — LACTATION NOTE
This note was copied from a baby's chart.  Brief Lactation Consult    Had brief conversation with Elizabeth in her room on the postpartum unit.  She is not planning to pump or breastfeed for her baby.  Discussed that if she changes her mind or wants to talk further about it, she should let her nurse know and a new lactation consult can be placed.    Of note, Elizabeth's urine tested positive for amphetamines and fentanyl this morning.      Plan: Will discontinue lactation consult at this time.     Fatimah Hurt RN, IBCLC   Lactation Consultant  Ascom: *[phone]  Office: [phone]

## 2023-12-27 NOTE — PROVIDER NOTIFICATION
12/27/23 1340   Provider Notification   Provider Name/Title Dr Goddard   Method of Notification Electronic Page   Request Evaluate-Remote   Notification Reason Status Update     FYI: patient off the unit since at least 10:00.  I have called her phone, straight to voicemail, checked NICU. Left VM at 13:38 stating she needed to return to unit within 30 minutes or would be discharged per policy. Do you have anything additional?

## 2023-12-27 NOTE — PLAN OF CARE
Patient reported high pain levels at start of shift. I gave patient pain meds at 0704 and 0806. I spoke to provider about a new pain management plan and orders medication orders were changed. Pt was given additional pain meds at 0910 per her request.  Right after receiving the meds patient asked if she could visit baby in the NICU.  I walked her and her partner to the NICU around 0920.  They returned to the unit by 10:00.  She stated they were going to go out to smoke then.  I reminded her that she could have additional pain meds around 1130.  She stated they would be back by then.  Around 13:00 and 13:30, I called her phone and it went straight to voicemail.  At 13:38, I paged the provider and left a voicemail for the patient stating she needed to be back to the unit by 14:08 or she would be considered discharged, per policy.  Her belongings would be left with security and if she felt the need to be readmitted, she would need to go through the emergency room for that process.   I spoke to the provider shortly after that voicemail and she stated, she would really like the patient to receive discharge instructions and discharge meds.  At 14:40, I called the patient's alternate contact number, but the call was not answered.  I paged the provider to let her know that risk management recommended that discharge order be placed.  Patient will be considered discharged. Discharge orders placed by provider.    Communicated with social work (Kusum Chappell at 15:30 to update on patient status, that belongings were still in patient room and that the birth certificate had not been completed.   Genie stated that the birth certifcate could be filed w/no baby name, that they would not be following up on that. Kusum stated she would advice Elizabeth's social work of the status and would ask her to follow up and help patient retrieve belongings.

## 2023-12-29 LAB
AMPHET UR CFM-MCNC: 2060 NG/ML
AMPHET/CREAT UR: 2215 NG/MG {CREAT}
FENTANYL UR CFM-MCNC: 136 NG/ML
FENTANYL/CREAT UR: 146 NG/MG {CREAT}
HYDROMORPHONE UR CFM-MCNC: 114 NG/ML
HYDROMORPHONE/CREAT UR: 123 NG/MG {CREAT}
METHAMPHET UR CFM-MCNC: 4180 NG/ML
METHAMPHET/CREAT UR: 4495 NG/MG {CREAT}
MORPHINE UR CFM-MCNC: 91 NG/ML
MORPHINE/CREAT UR: 98 NG/MG {CREAT}
NORFENTANYL UR CFM-MCNC: >3000 NG/ML
NORFENTANYL/CREAT UR: ABNORMAL
OXYCODONE UR CFM-MCNC: 676 NG/ML
OXYCODONE/CREAT UR: 727 NG/MG {CREAT}
OXYMORPHONE UR CFM-MCNC: 819 NG/ML
OXYMORPHONE/CREAT UR: 881 NG/MG {CREAT}
PATH REPORT.COMMENTS IMP SPEC: NORMAL
PATH REPORT.COMMENTS IMP SPEC: NORMAL
PATH REPORT.FINAL DX SPEC: NORMAL
PATH REPORT.GROSS SPEC: NORMAL
PATH REPORT.MICROSCOPIC SPEC OTHER STN: NORMAL
PATH REPORT.RELEVANT HX SPEC: NORMAL
PHOTO IMAGE: NORMAL

## 2024-01-27 ENCOUNTER — HEALTH MAINTENANCE LETTER (OUTPATIENT)
Age: 29
End: 2024-01-27

## 2024-04-18 PROBLEM — O09.33 NO PRENATAL CARE IN CURRENT PREGNANCY IN THIRD TRIMESTER: Status: RESOLVED | Noted: 2023-12-26 | Resolved: 2024-04-18

## 2024-04-18 PROBLEM — J45.51 SEVERE PERSISTENT ASTHMA WITH ACUTE EXACERBATION (H): Status: ACTIVE | Noted: 2018-02-01

## 2025-02-01 ENCOUNTER — HEALTH MAINTENANCE LETTER (OUTPATIENT)
Age: 30
End: 2025-02-01

## (undated) DEVICE — DRSG MEDIPORE 3 1/2X13 3/4" 3573

## (undated) DEVICE — LINEN TOWEL PACK X6 WHITE 5487

## (undated) DEVICE — PREP CHLORAPREP 26ML TINTED HI-LITE ORANGE 930815

## (undated) DEVICE — SPONGE LAP 18X18" X8435

## (undated) DEVICE — LINEN GOWN XLG 5407

## (undated) DEVICE — SU PDS II 0 CTX 36" Z370T

## (undated) DEVICE — SUCTION MANIFOLD NEPTUNE 2 SYS 4 PORT 0702-020-000

## (undated) DEVICE — CATH TRAY FOLEY SURESTEP 16FR W/URNE MTR STLK LATEX A303316A

## (undated) DEVICE — PACK POST PARTUM

## (undated) DEVICE — SOL NACL 0.9% IRRIG 1000ML BOTTLE 2F7124

## (undated) DEVICE — SOL WATER IRRIG 1000ML BOTTLE 2F7114

## (undated) DEVICE — SU MONOCRYL 4-0 PS-2 18" UND Y496G

## (undated) DEVICE — ESU GROUND PAD ADULT W/CORD E7507

## (undated) DEVICE — PACK C-SECTION LF PL15OTA83B

## (undated) DEVICE — SU VICRYL 3-0 SH 27" J316H

## (undated) DEVICE — SYR BULB IRRIG DOVER 60 ML LATEX FREE 67000

## (undated) DEVICE — STPL SKIN 35W ROTATING HEAD PRW35

## (undated) DEVICE — TUBING SUCTION 10'X3/16" N510

## (undated) DEVICE — BLADE KNIFE SURG 10 371110

## (undated) DEVICE — SU VICRYL 0 CT-1 3X27" J430T

## (undated) DEVICE — DRAPE STERI INCISE 1050

## (undated) DEVICE — PAD CHUX UNDERPAD 23X24" 7136

## (undated) DEVICE — LINEN TOWEL PACK X30 5481

## (undated) RX ORDER — FENTANYL CITRATE 50 UG/ML
INJECTION, SOLUTION INTRAMUSCULAR; INTRAVENOUS
Status: DISPENSED
Start: 2023-12-25

## (undated) RX ORDER — HYDROMORPHONE HCL IN WATER/PF 6 MG/30 ML
PATIENT CONTROLLED ANALGESIA SYRINGE INTRAVENOUS
Status: DISPENSED
Start: 2023-12-26

## (undated) RX ORDER — HYDROMORPHONE HYDROCHLORIDE 1 MG/ML
INJECTION, SOLUTION INTRAMUSCULAR; INTRAVENOUS; SUBCUTANEOUS
Status: DISPENSED
Start: 2023-12-26

## (undated) RX ORDER — PROPOFOL 10 MG/ML
INJECTION, EMULSION INTRAVENOUS
Status: DISPENSED
Start: 2023-12-26

## (undated) RX ORDER — KETOROLAC TROMETHAMINE 30 MG/ML
INJECTION, SOLUTION INTRAMUSCULAR; INTRAVENOUS
Status: DISPENSED
Start: 2023-12-26

## (undated) RX ORDER — FENTANYL CITRATE 50 UG/ML
INJECTION, SOLUTION INTRAMUSCULAR; INTRAVENOUS
Status: DISPENSED
Start: 2023-12-26